# Patient Record
(demographics unavailable — no encounter records)

---

## 2024-10-10 NOTE — CONSULT LETTER
[Dear  ___] : Dear  [unfilled], [Consult Letter:] : I had the pleasure of evaluating your patient, [unfilled]. [( Thank you for referring [unfilled] for consultation for _____ )] : Thank you for referring [unfilled] for consultation for [unfilled] [Please see my note below.] : Please see my note below. [Consult Closing:] : Thank you very much for allowing me to participate in the care of this patient.  If you have any questions, please do not hesitate to contact me. [Sincerely,] : Sincerely, [FreeTextEntry2] : Dr. Jeannine Sharma (pulm/ref)\par  Dr. Dm Ham (PCP)  [FreeTextEntry3] : Ludwig Bee MD, MPH \par  System Director of Thoracic Surgery \par  Director of Comprehensive Lung and Foregut [place] \par  Professor Cardiovascular & Thoracic Surgery \par  [facility]\par  [facility]\par  [address]\par  Oncology [address]\[place] [address]\par  Tel: [phone]\par  Fax: [phone]\par

## 2024-10-10 NOTE — ASSESSMENT
[FreeTextEntry1] : Mr. JULIA MONTERO, 61 year old male, former smoker, w/ hx of hypothyroidism and lung CA.  Now 5 years s/p Rt VATS Robotic-assisted, RUL wedge rxn, RULobectomy, MLND on 8/28/19. 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 Two synchronous primary tumors in the same lobe.  CT Chest on 9/21/24: - post-op changes - Stable left fissural 0.4 cm nodule/lymph node.  - Stable right lower lobe 0.3 cm subpleural nodule (series 4 image 365). - No consolidation, effusion or pneumothorax. No new or enlarging nodules. No lymphadenopathy. - Interval increase in right 10th rib sclerosis with surrounding soft tissue masslike thickening, concerning for metastatic disease.  PET/CT on 10/05/2024:  - Right 10th rib with cortical disruptions and surrounding soft tissue component (images 136- 154) measuring 3.1 x 1.7 cm with diffuse activity of SUV 16.9, compatible with malignant process. - No focal abnormal activity in the right upper lung postsurgical changes. - Left fissural 0.4 cm nodule (image 101) is nonavid; right lower lobe subpleural nodule is not visualized on the low-dose CT which may be due to differences in spatial resolution. - Stable mild nodularity of the medial limb of the left adrenal gland (image 137) with nonspecific activity of SUV 4.7 (previously SUV 3.9); nonspecific activity could also be seen in the right adrenal gland (image 132) showing SUV 4.0 (previously SUV 5.0). These glands are not suspicious for malignant process given their stability since 7/2019 PET/CT.  I have reviewed the patient's medical records and diagnostic images at time of this office consultation and have made the following recommendation: 1. PET scan reviewed, FDG-avid rib lesion is highly suspicious, referred to IR Dr. Bojorquez for biopsy of Rt 10th rib lesion. 2. RTC after biopsy   I, STEVIE Salamanca, personally performed the evaluation and management (E/M) services for this established patient who presents today with (a) new problem(s)/exacerbation of (an) existing condition(s). That E/M includes conducting the examination, assessing all new/exacerbated conditions, and establishing a new plan of care. Today, my ACP, Elaine Cho NP was here to observe my evaluation and management services for this new problem/exacerbated condition to be followed going forward.

## 2024-10-10 NOTE — REASON FOR VISIT
[Follow-Up: _____] : a [unfilled] follow-up visit [Home] : at home, [unfilled] , at the time of the visit. [Medical Office: ([facility])___] : at the medical office located in  [Patient] : the patient [Self] : self [This encounter was initiated by telehealth (audio with video) and converted to telephone (audio only) due to technical difficulties.] : This encounter was initiated by telehealth (audio with video) and converted to telephone (audio only) due to technical difficulties.

## 2024-10-10 NOTE — REASON FOR VISIT
[Follow-Up: _____] : a [unfilled] follow-up visit [Home] : at home, [unfilled] , at the time of the visit. [Medical Office: ([facility])___] : at the medical office located in  [Patient] : the patient [Self] : self [This encounter was initiated by telehealth (audio with video) and converted to telephone (audio only) due to technical difficulties.] : This encounter was initiated by telehealth (audio with video) and converted to telephone (audio only) due to technical difficulties.

## 2024-10-10 NOTE — HISTORY OF PRESENT ILLNESS
[FreeTextEntry1] : Mr. JULIA MONTERO, 61 year old male, former smoker, w/ hx of hypothyroidism and lung CA.  Now 5 years s/p Rt VATS Robotic-assisted, RUL wedge rxn, RULobectomy, MLND on 8/28/19. 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 Two synchronous primary tumors in the same lobe.  CT Chest on 2/15/20: - post-op changes - stable two solid nodules 3 mm in the lingula and superior segment RLL - small nodule midportion of Lt major fissure  CT Chest on 8/8/2020: - stable post-op changes - two stable 3mm nodules in the lingula (3:121) and RLL (3:56) since 7/23/19  CT Chest w/o contrast on 2/13/2021: - S/p RUL lobectomy - two stable 3mm nodules in the lingula and RLL are unchanged.  - small nodule noted in the left major fissure is unchanged.  CT Chest on 3/26/22: - post-op changes - stable 5mm Lt major fissure nodule - stable 3mm RLL nodule   CT chest on 09/24/2022: - post-op changes - Stable 5 mm nodule along the Lt major fissure (2:75)  - stable 2 mm RLL nodule on image 44 - No new nodules  CT chest on 09/16/2023:  - Status post right upper lobectomy with stable postsurgical changes. - Stable 2 mm subpleural nodule within right lower lobe (series 3 image 50).  - Bandlike atelectasis within the lingula. - There is new fracture of the right 10th rib and underlying lucency (series 3 image 130).  Spirometry on 8/30/24: FVC 3.09, 75%; FEV1 2.09, 64%  CT Chest on 9/21/24: - post-op changes - Stable left fissural 0.4 cm nodule/lymph node.  - Stable right lower lobe 0.3 cm subpleural nodule (series 4 image 365). - No consolidation, effusion or pneumothorax. No new or enlarging nodules. No lymphadenopathy. - Interval increase in right 10th rib sclerosis with surrounding soft tissue masslike thickening, concerning for metastatic disease.  On 09/26/2024, CT scan reviewed, increased in right 10th rib sclerosis with surrounding soft tissue masslike thickening, concerning for metastatic disease, I recommended a PET/CT scan. RTC via TTM after PET scan to discuss possible FNA.  PET/CT on 10/05/2024:  - Right 10th rib with cortical disruptions and surrounding soft tissue component (images 136- 154) measuring 3.1 x 1.7 cm with diffuse activity of SUV 16.9, compatible with malignant process. - No focal abnormal activity in the right upper lung postsurgical changes. - Left fissural 0.4 cm nodule (image 101) is nonavid; right lower lobe subpleural nodule is not visualized on the low-dose CT which may be due to differences in spatial resolution. - Stable mild nodularity of the medial limb of the left adrenal gland (image 137) with nonspecific activity of SUV 4.7 (previously SUV 3.9); nonspecific activity could also be seen in the right adrenal gland (image 132) showing SUV 4.0 (previously SUV 5.0). These glands are not suspicious for malignant process given their stability since 7/2019 PET/CT.  Patient is here today for a follow up.

## 2024-10-10 NOTE — CONSULT LETTER
[Dear  ___] : Dear  [unfilled], [Consult Letter:] : I had the pleasure of evaluating your patient, [unfilled]. [( Thank you for referring [unfilled] for consultation for _____ )] : Thank you for referring [unfilled] for consultation for [unfilled] [Please see my note below.] : Please see my note below. [Consult Closing:] : Thank you very much for allowing me to participate in the care of this patient.  If you have any questions, please do not hesitate to contact me. [Sincerely,] : Sincerely, [FreeTextEntry2] : Dr. Jeannine Sharma (pulm/ref)\par  Dr. Dm Ham (PCP)  [FreeTextEntry3] : Ludwig Bee MD, MPH \par  System Director of Thoracic Surgery \par  Director of Comprehensive Lung and Foregut [place] \par  Professor Cardiovascular & Thoracic Surgery \par  [facility] at [facility]\par  [facility]\par  [address]\par  Oncology [address]\[place] [address]\par  Tel: [phone]\par  Fax: [phone]\par

## 2024-10-10 NOTE — CONSULT LETTER
[Dear  ___] : Dear  [unfilled], [Consult Letter:] : I had the pleasure of evaluating your patient, [unfilled]. [( Thank you for referring [unfilled] for consultation for _____ )] : Thank you for referring [unfilled] for consultation for [unfilled] [Please see my note below.] : Please see my note below. [Consult Closing:] : Thank you very much for allowing me to participate in the care of this patient.  If you have any questions, please do not hesitate to contact me. [Sincerely,] : Sincerely, [FreeTextEntry2] : Dr. Jeannine Sharma (pulm/ref)\par  Dr. Dm Ham (PCP)  [FreeTextEntry3] : Ludwig Bee MD, MPH \par  System Director of Thoracic Surgery \par  Director of Comprehensive Lung and Foregut [place] \par  Professor Cardiovascular & Thoracic Surgery \par  [facility] at [facility]\par  [facility]\par  [address]\par  Oncology [address]\[place] [address]\par  Tel: [phone]\par  Fax: [phone]\par

## 2024-10-10 NOTE — REASON FOR VISIT
[Follow-Up: _____] : a [unfilled] follow-up visit [Home] : at home, [unfilled] , at the time of the visit. [Medical Office: ([facility])___] : at the medical office located in  [Patient] : the patient [Self] : self [This encounter was initiated by telehealth (audio with video) and converted to telephone (audio only) due to technical difficulties.] : This encounter was initiated by telehealth (audio with video) and converted to telephone (audio only) due to technical difficulties.

## 2024-10-24 NOTE — ASSESSMENT
[FreeTextEntry1] : Advised Angel to be continue to use oral appliance for his underlying history of obstructive sleep apnea. Return for repeat pulmonary function test and pulmonary follow-up in 2 months. Bipin refused pulmonary treatment because he feels that he is completely asymptomatic from pulmonary perspective.   Will get right rib CT-guided biopsy in the near future.   Thoracic surgery follow-up with Dr. Ludwig Bee.

## 2024-10-24 NOTE — HISTORY OF PRESENT ILLNESS
[TextBox_4] : Has mild right lower chest wall pain/atypical chest pain.  Recently found to have abnormal bone/rib FDG uptake on PET scan.  Scheduled to undergo bone biopsy in the near future.  Dr. Olivares started Angel on oral appliance for ARMANDO.

## 2024-10-24 NOTE — PROCEDURE
[FreeTextEntry1] : Pulmonary Function Test performed in my office today: Spirometry: Moderate obstructive airway disease; Lung Volume: Within normal limits; Diffusion: Severe impairment. ________  Exhaled Nitric Oxide             Final  No Documents Attached    	Test  	Result  	Flag	Reference	Goal	Last Verified  	Exhaled Nitric Oxide	15	 	 		REQUIRED  Ordered by: CATALINO LEAL       Collected/Examined: [id number] 08:09AM       Verification Required       Stage: Final       Performed at: In Office       Performed by: MACY RICE       Resulted: 24Oct2024 08:09AM       Last Updated: 24Oct2024 08:14AM        __________  PET/CT FDG Skull to Thigh ONC Subsequent Treatment Strategy             Final  No Documents Attached    	 EXAM: [id number] - PETCT SK-THI ONC FDG SUBS  - ORDERED BY: STEVIE THORPE   PROCEDURE DATE:  10/05/2024    INTERPRETATION:  CLINICAL INFORMATION: 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 I.V. SITE: Right antecubital fossa UPTAKE PERIOD: 60 minutes SCANNER: Siemens Biograph mCT ORAL CONTRAST: Omnipaque 300 PHARMACOLOGIC INTERVENTION: None.  TECHNIQUE: Following intravenous injection of above radiopharmaceutical and uptake period, PET/CT was performed. CT protocol was optimized for PET attenuation correction and anatomic localization and was not designed to produce and cannot replace state-of-the-art diagnostic CT images with specific imaging protocols for different body parts and indications. Images were reconstructed and reviewed in axial, coronal and sagittal views and three-dimensional MIP.  The standardized uptake values (SUV) are normalized to patient body weight and indicate the highest activity concentration (SUVmax) in a given site. All image numbers refer to axial image number.  COMPARISON:  FDG PET/CT 7/28/2019..  OTHER STUDIES USED FOR CORRELATION: CT chest 9/21/2024.  FINDINGS:  HEAD/NECK: Physiologic FDG activity in visualized brain, head, and neck.  THORAX: Right 10th rib with cortical disruptions and surrounding soft tissue component (images 136- 154) measuring 3.1 x 1.7 cm with diffuse activity of SUV 16.9, compatible with malignant process.  No hypermetabolic nodes in the thorax.  LUNGS: No focal abnormal activity in the right upper lung postsurgical changes. Left fissural 0.4 cm nodule (image 101) is nonavid; right lower lobe subpleural nodule is not visualized on the low-dose CT which may be due to differences in spatial resolution.  No abnormality involving  PLEURA/PERICARDIUM: No abnormal FDG activity. No effusion.  HEPATOBILIARY/PANCREAS: Physiologic FDG activity.  For reference, normal liver demonstrates SUV mean 3.1; previously 3.4.  SPLEEN: Physiologic FDG activity. Normal in size.  ADRENAL GLANDS: Stable mild nodularity of the medial limb of the left adrenal gland (image 137) with nonspecific activity of SUV 4.7 (previously SUV 3.9); nonspecific activity could also be seen in the right adrenal gland (image 132) showing SUV 4.0 (previously SUV 5.0). These glands are not suspicious for malignant process given their stability since 7/2019 PET/CT.  KIDNEYS/URINARY BLADDER: Physiologic excreted FDG activity.  REPRODUCTIVE ORGANS: No abnormal FDG activity.  ABDOMINOPELVIC LYMPH NODES/RETROPERITONEUM: No enlarged or FDG-avid lymph node.  ESOPHAGUS/STOMACH/BOWEL/PERITONEUM/MESENTERY: Distal esophagus with morphologically unremarkable wall showing nonspecific FDG activity.  VESSELS: Few scattered atherosclerotic changes in the aorta and in the coronary arteries. No aneurysm.  BONES/SOFT TISSUES: Aside from the right 10th rib, no other suspicious FDG avid bone lesions.  IMPRESSION: 1. Right rib with cortical disruptions and surrounding soft tissue component associated with diffuse increased activity compatible with malignant processes. Tissue confirmation recommended.  2. No FDG avid lung lesions. Stable nonavid left fissural nodule; right lower lobe subpleural nodule not visualized on low-dose CT which may be due to differences in spatial resolution.  3. No other suspicious FDG avid lesions in the remaining field of view.  --- End of Report ---       KALPANA FRIAS MD; Attending Radiologist This document has been electronically signed. Oct  7 2024  4:52PM  Ordered by: STEVIE THORPE       Collected/Examined: [id number] 02:15PM       Verification Required       Stage: Final       Performed at: [facility] [place]       Resulted: [id number] 04:23PM       Last Updated: 07Oct2024 04:55PM       Accession: [id number]

## 2024-10-24 NOTE — PROCEDURE
[FreeTextEntry1] : Pulmonary Function Test performed in my office today: Spirometry: Moderate obstructive airway disease; Lung Volume: Within normal limits; Diffusion: Severe impairment. ________  Exhaled Nitric Oxide             Final  No Documents Attached    	Test  	Result  	Flag	Reference	Goal	Last Verified  	Exhaled Nitric Oxide	15	 	 		REQUIRED  Ordered by: CATALINO LEAL       Collected/Examined: [id number] 08:09AM       Verification Required       Stage: Final       Performed at: In Office       Performed by: MACY RICE       Resulted: 24Oct2024 08:09AM       Last Updated: 24Oct2024 08:14AM        __________  PET/CT FDG Skull to Thigh ONC Subsequent Treatment Strategy             Final  No Documents Attached    	 EXAM: [id number] - PETCT SK-THI ONC FDG SUBS  - ORDERED BY: STEVIE THORPE   PROCEDURE DATE:  10/05/2024    INTERPRETATION:  CLINICAL INFORMATION: 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 I.V. SITE: Right antecubital fossa UPTAKE PERIOD: 60 minutes SCANNER: Siemens Biograph mCT ORAL CONTRAST: Omnipaque 300 PHARMACOLOGIC INTERVENTION: None.  TECHNIQUE: Following intravenous injection of above radiopharmaceutical and uptake period, PET/CT was performed. CT protocol was optimized for PET attenuation correction and anatomic localization and was not designed to produce and cannot replace state-of-the-art diagnostic CT images with specific imaging protocols for different body parts and indications. Images were reconstructed and reviewed in axial, coronal and sagittal views and three-dimensional MIP.  The standardized uptake values (SUV) are normalized to patient body weight and indicate the highest activity concentration (SUVmax) in a given site. All image numbers refer to axial image number.  COMPARISON:  FDG PET/CT 7/28/2019..  OTHER STUDIES USED FOR CORRELATION: CT chest 9/21/2024.  FINDINGS:  HEAD/NECK: Physiologic FDG activity in visualized brain, head, and neck.  THORAX: Right 10th rib with cortical disruptions and surrounding soft tissue component (images 136- 154) measuring 3.1 x 1.7 cm with diffuse activity of SUV 16.9, compatible with malignant process.  No hypermetabolic nodes in the thorax.  LUNGS: No focal abnormal activity in the right upper lung postsurgical changes. Left fissural 0.4 cm nodule (image 101) is nonavid; right lower lobe subpleural nodule is not visualized on the low-dose CT which may be due to differences in spatial resolution.  No abnormality involving  PLEURA/PERICARDIUM: No abnormal FDG activity. No effusion.  HEPATOBILIARY/PANCREAS: Physiologic FDG activity.  For reference, normal liver demonstrates SUV mean 3.1; previously 3.4.  SPLEEN: Physiologic FDG activity. Normal in size.  ADRENAL GLANDS: Stable mild nodularity of the medial limb of the left adrenal gland (image 137) with nonspecific activity of SUV 4.7 (previously SUV 3.9); nonspecific activity could also be seen in the right adrenal gland (image 132) showing SUV 4.0 (previously SUV 5.0). These glands are not suspicious for malignant process given their stability since 7/2019 PET/CT.  KIDNEYS/URINARY BLADDER: Physiologic excreted FDG activity.  REPRODUCTIVE ORGANS: No abnormal FDG activity.  ABDOMINOPELVIC LYMPH NODES/RETROPERITONEUM: No enlarged or FDG-avid lymph node.  ESOPHAGUS/STOMACH/BOWEL/PERITONEUM/MESENTERY: Distal esophagus with morphologically unremarkable wall showing nonspecific FDG activity.  VESSELS: Few scattered atherosclerotic changes in the aorta and in the coronary arteries. No aneurysm.  BONES/SOFT TISSUES: Aside from the right 10th rib, no other suspicious FDG avid bone lesions.  IMPRESSION: 1. Right rib with cortical disruptions and surrounding soft tissue component associated with diffuse increased activity compatible with malignant processes. Tissue confirmation recommended.  2. No FDG avid lung lesions. Stable nonavid left fissural nodule; right lower lobe subpleural nodule not visualized on low-dose CT which may be due to differences in spatial resolution.  3. No other suspicious FDG avid lesions in the remaining field of view.  --- End of Report ---       KALPANA FRIAS MD; Attending Radiologist This document has been electronically signed. Oct  7 2024  4:52PM  Ordered by: STEVIE THORPE       Collected/Examined: [id number] 02:15PM       Verification Required       Stage: Final       Performed at: [facility]       Resulted: [id number] 04:23PM       Last Updated: 07Oct2024 04:55PM       Accession: [id number]

## 2024-10-24 NOTE — REASON FOR VISIT
[Follow-Up] : a follow-up visit [Abnormal CXR/ Chest CT] : an abnormal CXR/ chest CT [Chest Pain] : chest pain [Asthma] : asthma [Sleep Apnea] : sleep apnea

## 2024-10-29 NOTE — HISTORY OF PRESENT ILLNESS
[FreeTextEntry1] :  61 year old male, former smoker, w/ hx of hypothyroidism and lung CA. s/p Rt VATS Robotic-assisted, RUL wedge rxn, RULobectomy, MLND on 8/28/19. Path revealed RUL AdenoCA,Patient followed by DR. Ludwig Bee.   Patient had f/u Ct chest done on 9/21/24: post-op changes Stable left fissural 0.4 cm nodule/lymph node. Stable right lower lobe 0.3 cm subpleural nodule (series 4 image 365). No consolidation, effusion or pneumothorax. No new or enlarging nodules. No lymphadenopathy. Interval increase in right 10th rib sclerosis with surrounding soft tissue masslike thickening, concerning for metastatic disease. PET/CT on 10/05/2024: Right 10th rib with cortical disruptions and surrounding soft tissue component (images 136- 154) measuring 3.1 x 1.7 cm with diffuse activity of SUV 16.9, compatible with malignant process. No focal abnormal activity in the right upper lung postsurgical changes. Left fissural 0.4 cm nodule (image 101) is nonavid; right lower lobe subpleural nodule is not visualized on the low-dose CT which may be due to differences in spatial resolution.Stable mild nodularity of the medial limb of the left adrenal gland (image 137) with nonspecific activity of SUV 4.7 (previously SUV 3.9); nonspecific activity could also be seen in the right adrenal gland (image 132) showing SUV 4.0 (previously SUV 5.0). These glands are not suspicious for malignant process given their stability since 7/2019 PET/CT.  Patient is now being referred by Dr. Bee for consultation to discuss right 10th rib soft tissue bx.   Denies any recent SOB, CP, fever, chills, n/v/d. ??/

## 2024-11-05 NOTE — HISTORY OF PRESENT ILLNESS
[FreeTextEntry1] : pt is a 62 y/o male with hx of lung cancer  adenocarcinoma 2019 .pt s/p resection .pt with persistent right rib pain .pt with recent pet ct c/w possible metastatic dx for bone biopsy .pt feels well pt denies any chest  pain dizziness ,lightheadedness ,nausea vomiting diaphoresis pt only issue is right rib pain pain rwraps around back right  pt with coronary dx non obstructive by hx calcium score  200s 2021

## 2024-11-05 NOTE — PHYSICAL EXAM
[Alert] : alert [No Respiratory Distress] : no respiratory distress [Normal Rate] : heart rate was normal  [Oriented x3] : oriented to person, place, and time

## 2024-11-05 NOTE — HISTORY OF PRESENT ILLNESS
[FreeTextEntry1] : pt is a 60 y/o male with hx of lung cancer  adenocarcinoma 2019 .pt s/p resection .pt with persistent right rib pain .pt with recent pet ct c/w possible metastatic dx for bone biopsy .pt feels well pt denies any chest  pain dizziness ,lightheadedness ,nausea vomiting diaphoresis pt only issue is right rib pain pain rwraps around back right  pt with coronary dx non obstructive by hx calcium score  200s 2021

## 2024-11-20 NOTE — DATA REVIEWED
[FreeTextEntry1] : I have independently reviewed the following:  CT-guided right 10th rib lesion biopsy on 11/13/24

## 2024-11-20 NOTE — ASSESSMENT
[FreeTextEntry1] : Mr. JULIA MONTERO, 61 year old male, former smoker, w/ hx of hypothyroidism and lung CA.  Now 5 years s/p Rt VATS Robotic-assisted, RUL wedge rxn, RULobectomy, MLND on 8/28/19. 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 Two synchronous primary tumors in the same lobe.  On 09/26/2024, CT scan reviewed, increased in right 10th rib sclerosis with surrounding soft tissue masslike thickening, concerning for metastatic disease, I recommended a PET/CT scan. RTC via TTM after PET scan to discuss possible FNA.   I have reviewed the patient's medical records and diagnostic images at time of this office consultation and have made the following recommendation: 1.    I, STEVIE Salamanca, personally performed the evaluation and management (E/M) services for this established patient who presents today with (a) new problem(s)/exacerbation of (an) existing condition(s).  That E/M includes conducting the examination, assessing all new/exacerbated conditions, and establishing a new plan of care.  Today, my ACP, Britt Mancia, MICHAELA-BC was here to observe my evaluation and management services for this new problem/exacerbated condition to be followed going forward.

## 2024-11-20 NOTE — HISTORY OF PRESENT ILLNESS
[FreeTextEntry1] : Mr. JULIA MONTERO, 61 year old male, former smoker, w/ hx of hypothyroidism and lung CA.  Now 5 years s/p Rt VATS Robotic-assisted, RUL wedge rxn, RULobectomy, MLND on 8/28/19. 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 Two synchronous primary tumors in the same lobe.  CT chest on 09/24/2022: - post-op changes - Stable 5 mm nodule along the Lt major fissure (2:75)  - stable 2 mm RLL nodule on image 44 - No new nodules  CT chest on 09/16/2023:  - Status post right upper lobectomy with stable postsurgical changes. - Stable 2 mm subpleural nodule within right lower lobe (series 3 image 50).  - Bandlike atelectasis within the lingula. - There is new fracture of the right 10th rib and underlying lucency (series 3 image 130).  Spirometry on 8/30/24: FVC 3.09, 75%; FEV1 2.09, 64%  CT Chest on 9/21/24: - post-op changes - Stable left fissural 0.4 cm nodule/lymph node.  - Stable right lower lobe 0.3 cm subpleural nodule (series 4 image 365). - No consolidation, effusion or pneumothorax. No new or enlarging nodules. No lymphadenopathy. - Interval increase in right 10th rib sclerosis with surrounding soft tissue masslike thickening, concerning for metastatic disease.  On 09/26/2024, CT scan reviewed, increased in right 10th rib sclerosis with surrounding soft tissue masslike thickening, concerning for metastatic disease, I recommended a PET/CT scan. RTC via TTM after PET scan to discuss possible FNA.  PET/CT on 10/05/2024:  - Right 10th rib with cortical disruptions and surrounding soft tissue component (images 136- 154) measuring 3.1 x 1.7 cm with diffuse activity of SUV 16.9, compatible with malignant process. - No focal abnormal activity in the right upper lung postsurgical changes. - Left fissural 0.4 cm nodule (image 101) is nonavid; right lower lobe subpleural nodule is not visualized on the low-dose CT which may be due to differences in spatial resolution. - Stable mild nodularity of the medial limb of the left adrenal gland (image 137) with nonspecific activity of SUV 4.7 (previously SUV 3.9); nonspecific activity could also be seen in the right adrenal gland (image 132) showing SUV 4.0 (previously SUV 5.0). These glands are not suspicious for malignant process given their stability since 7/2019 PET/CT.  Now s/p CT-guided right 10th rib lesion biopsy on 11/13/24. Path revealed POSITIVE FOR MALIGNANT CELLS. Metastatic non-small cell carcinoma, favor adenocarcinoma of primary pulmonary origin.   Pt presents today for follow up.

## 2024-11-20 NOTE — CONSULT LETTER
[Dear  ___] : Dear  [unfilled], [Consult Letter:] : I had the pleasure of evaluating your patient, [unfilled]. [( Thank you for referring [unfilled] for consultation for _____ )] : Thank you for referring [unfilled] for consultation for [unfilled] [Please see my note below.] : Please see my note below. [Consult Closing:] : Thank you very much for allowing me to participate in the care of this patient.  If you have any questions, please do not hesitate to contact me. [Sincerely,] : Sincerely, [FreeTextEntry2] : Dr. Jeannine Sharma (pulm/ref)\par  Dr. Dm Ham (PCP)  [FreeTextEntry3] : Ludwig Bee MD, MPH \par  System Director of Thoracic Surgery \par  Director of Comprehensive Lung and Foregut [place] \par  Professor Cardiovascular & Thoracic Surgery \par  [facility]\par  [facility]\par  [address]\par  Oncology [address]\[place] [address]\par  Tel: [phone]\par  Fax: [phone]\par

## 2024-11-20 NOTE — CONSULT LETTER
[Dear  ___] : Dear  [unfilled], [Consult Letter:] : I had the pleasure of evaluating your patient, [unfilled]. [( Thank you for referring [unfilled] for consultation for _____ )] : Thank you for referring [unfilled] for consultation for [unfilled] [Please see my note below.] : Please see my note below. [Consult Closing:] : Thank you very much for allowing me to participate in the care of this patient.  If you have any questions, please do not hesitate to contact me. [Sincerely,] : Sincerely, [FreeTextEntry2] : Dr. Jeannine Sharma (pulm/ref)\par  Dr. Dm Ham (PCP)  [FreeTextEntry3] : Ludwig Bee MD, MPH \par  System Director of Thoracic Surgery \par  Director of Comprehensive Lung and Foregut [place] \par  Professor Cardiovascular & Thoracic Surgery \par  [facility]\par  [facility]\par  [address]\par  Oncology [address]\[place] [address]\par  Tel: [phone]\par  Fax: [phone]\par

## 2024-11-25 NOTE — PHYSICAL EXAM
[Fully active, able to carry on all pre-disease performance without restriction] : Status 0 - Fully active, able to carry on all pre-disease performance without restriction [Normal] : affect appropriate [de-identified] : No icterus  [de-identified] : MMM O/P Clear [de-identified] : Supple No LAD  [de-identified] : Clear [de-identified] : S1 S2 [de-identified] : No edema  [de-identified] : Soft NT No masses  [de-identified] : No spine tenderness; Right lateral lower chest wall tenderness  [de-identified] : Ambulatory

## 2024-11-25 NOTE — HISTORY OF PRESENT ILLNESS
[FreeTextEntry1] : Mr. Foss is a 60 yo man, former smoker with a history of Stage IA lung adenocarcinoma in 2019 who now presents with a right 10th rib metastatic lesion.   He was initially diagnosed in 2019 and underwent a Rt VATS Robotic-assisted RUL wedge resection, RULobectomy and MLND. Path revealed RUL adenocarcinoma, acinar predominant, 1.2 cm and 0.35 cm (minimally invasive adenocarcinoma, non-mucinous), G2, +OSMANI, margins negative and 0/9 LNs involved.  He was followed with active surveillance by Dr. Bee. Subsequent CTs showed a stable 5 mm nodule along the left major fissure and a stable RLL nodule. CT chest in 2023 showed a new fracture of the right 10th rib.  CT Chest 9/21/2024 again showed the stable nodules but noted increase in the right 10th rib sclerosis with surrounding soft tissue masslike thickening, concerning for metastatic disease. PET/CT on 10/05/2024 confirmed the right 10th rib with cortical disruptions and surrounding soft tissue component measuring 3.1 x 3.7 cm with diffuse activity compatible with malignant process, no focal activity int eh right upper lung, nonavid left fissural nodule.   He went on to have a CT-guided biopsy of the 10th rib lesion on 11/12/24. Path confirmed metastatic non-small cell carcinoma, favor adenocarcinoma of primary pulmonary origin.   11/26/ 2024: Mr. Foss presents today for consultation for radiation therapy to a right rib metastatic lesion. He also has an appointment with Dr. Haq today. He continues to follow with his pulmonologist. He has band-like pain along T10 for about a year and a half, rated 10/10 at its worst. He manages the pain with ibuprofen but is unable to lie on his back. He drives long distances for work, which is also painful but manageable. About 2 years ago, he bent down to tie his shoes and stated that he felt a "snap" and he thinks that is when he broke the rib.

## 2024-11-25 NOTE — ASSESSMENT
[Medication(s)] : Medication(s) [Palliative] : Goals of care discussed with patient: Palliative [FreeTextEntry1] : NSCLC with adenocarcinoma histology with a stage Ia tumor status post right upper lobectomy in August 2019.  Followed with surveillance.  Now with recurrent/metastatic disease involving the right 10th rib.  Tumor tested PD-L1 low-positive and molecular testing is pending.  Discussed that his disease represents recurrent disease or metastatic disease; given that he has a limited disease burden, discussed that the goal would be for long term survival. Recommend: - Obtain an MRI of his brain to complete his staging workup - Patient reports that he is unable to lay flat for any length of time.  He required oxycodone during his PET CT scan.  Prescribed some oxycodone to use as needed for significant pain.  I-stop reviewed. - Molecular testing is pending on his biopsy specimen; follow-up results. - Patient met with radiation oncology with plans for RT to his solitary metastasis.  Unclear if this will be SBRT versus palliative RT. - Provided his tumor testing is negative for an actionable mutation appropriate for targeting the first-line setting, discussed and recommended treatment with combination chemotherapy and immunotherapy with carboplatin AUC 5 and pemetrexed 500 mg/m2 with pembrolizumab 200 mg administered IV q. 3 weeks.  Risks, benefits and side effects discussed with the patient who agreed to proceed and signed the consent form; drug information sheets provided. - Obtain blood work in the office today in preparation for systemic therapy - In general, discussed that in the metastatic setting, systemic therapy is usually ongoing, for as long as it benefited the patient, or less he developed an intolerable side effect.  Discussed the plan to treat him with up to 4 cycles of triplet therapy followed by pemetrexed/pembrolizumab maintenance.  Given his limited disease burden, and depending upon his clinical course, if he receives definitive RT dosing to the isolated metastasis, could consider mimicking treatment that would be given in the adjuvant setting. -Patient follows with an outside pain management physician Dr. Jackson.  Currently on gabapentin and meloxicam. - In summary: Obtain MRI brain to complete a staging workup.  Follow-up foundation testing on his biopsy specimen.  If negative for an actionable mutation, would start treatment with chemotherapy and immunotherapy as outlined above.  To pursue RT to the right 10th rib.

## 2024-11-25 NOTE — HISTORY OF PRESENT ILLNESS
[Disease: _____________________] : Disease: [unfilled] [de-identified] : Mr. Foss is a 61-year-old man, former smoker, who was found with RUL lung nodule on CT lung screening in July 2019. In August 2019 he underwent right upper lobectomy revealing a 1.2cm lung adenocarcinoma, acinar predominant, with negative margins and no lymph node involvement, and a 0.35cm minimally invasive adenocarcinoma in the same lobe. He was monitored with surveillance scans. CT Chest on 9/16/23 revealed a new fracture of the right 10th rib with underlying lucency. CT Chest 9/21/24 showed increase in the right 10th rib sclerosis with surrounding soft tissue masslike thickening, concerning for metastatic disease. PET CT 10/5/24 reported diffuse activity of the right 10th rib with cortical disruptions and surrounding soft tissue component measuring 3.1 x 1.7cm, compatible with malignant process. No other suspicious FDG avid lesions. He underwent CT guided core biopsy of the right 10th rib on 11/13/24, with pathology interpreted as showing metastatic non-small cell carcinoma, favor adenocarcinoma or primary pulmonary origin; tumor tested PDL1 Low-Positive. He is now referred for initial medical oncology consultation.   Patient reports pain in his mid to lower back that wraps around anteriorly mostly on the right side.  Pain started about 6 months after he had traumatic falls.  He follows with an outside pain management specialist, Dr. Jackson and is on gabapentin and meloxicam.  He also notes that he has a disc herniation at T11/T12; he did PT in the past as well as saw a chiropractor without improvement.  He continues to work full-time.  Denies cough.  Weight stable. [de-identified] : Right 10th rib, CT-guided core biopsy: metastatic non-small cell carcinoma, favor adenocarcinoma of primary pulmonary origin.  PD-L1 low-positive at 20%.

## 2024-11-25 NOTE — PHYSICAL EXAM
[Normal] : normal heart rate and rhythm, normal S1 and S2, and no murmurs present [de-identified] : TTP along right 10th rib, lateral

## 2024-11-25 NOTE — PHYSICAL EXAM
[Normal] : normal heart rate and rhythm, normal S1 and S2, and no murmurs present [de-identified] : TTP along right 10th rib, lateral

## 2024-11-25 NOTE — HISTORY OF PRESENT ILLNESS
[Disease: _____________________] : Disease: [unfilled] [de-identified] : Mr. Foss is a 61-year-old man, former smoker, who was found with RUL lung nodule on CT lung screening in July 2019. In August 2019 he underwent right upper lobectomy revealing a 1.2cm lung adenocarcinoma, acinar predominant, with negative margins and no lymph node involvement, and a 0.35cm minimally invasive adenocarcinoma in the same lobe. He was monitored with surveillance scans. CT Chest on 9/16/23 revealed a new fracture of the right 10th rib with underlying lucency. CT Chest 9/21/24 showed increase in the right 10th rib sclerosis with surrounding soft tissue masslike thickening, concerning for metastatic disease. PET CT 10/5/24 reported diffuse activity of the right 10th rib with cortical disruptions and surrounding soft tissue component measuring 3.1 x 1.7cm, compatible with malignant process. No other suspicious FDG avid lesions. He underwent CT guided core biopsy of the right 10th rib on 11/13/24, with pathology interpreted as showing metastatic non-small cell carcinoma, favor adenocarcinoma or primary pulmonary origin; tumor tested PDL1 Low-Positive. He is now referred for initial medical oncology consultation.   Patient reports pain in his mid to lower back that wraps around anteriorly mostly on the right side.  Pain started about 6 months after he had traumatic falls.  He follows with an outside pain management specialist, Dr. Jackson and is on gabapentin and meloxicam.  He also notes that he has a disc herniation at T11/T12; he did PT in the past as well as saw a chiropractor without improvement.  He continues to work full-time.  Denies cough.  Weight stable. [de-identified] : Right 10th rib, CT-guided core biopsy: metastatic non-small cell carcinoma, favor adenocarcinoma of primary pulmonary origin.  PD-L1 low-positive at 20%.

## 2024-11-25 NOTE — PHYSICAL EXAM
[Normal] : normal heart rate and rhythm, normal S1 and S2, and no murmurs present [de-identified] : TTP along right 10th rib, lateral

## 2024-11-25 NOTE — HISTORY OF PRESENT ILLNESS
[FreeTextEntry1] : Mr. Foss is a 60 yo man, former smoker with a history of Stage IA lung adenocarcinoma in 2019 who now presents with a right 10th rib metastatic lesion.   He was initially diagnosed in 2019 and underwent a Rt VATS Robotic-assisted RUL wedge resection, RULobectomy and MLND. Path revealed RUL adenocarcinoma, acinar predominant, 1.2 cm and 0.35 cm (minimally invasive adenocarcinoma, non-mucinous), G2, +OSMANI, margins negative and 0/9 LNs involved.  He was followed with active surveillance by Dr. eBe. Subsequent CTs showed a stable 5 mm nodule along the left major fissure and a stable RLL nodule. CT chest in 2023 showed a new fracture of the right 10th rib.  CT Chest 9/21/2024 again showed the stable nodules but noted increase in the right 10th rib sclerosis with surrounding soft tissue masslike thickening, concerning for metastatic disease. PET/CT on 10/05/2024 confirmed the right 10th rib with cortical disruptions and surrounding soft tissue component measuring 3.1 x 3.7 cm with diffuse activity compatible with malignant process, no focal activity int eh right upper lung, nonavid left fissural nodule.   He went on to have a CT-guided biopsy of the 10th rib lesion on 11/12/24. Path confirmed metastatic non-small cell carcinoma, favor adenocarcinoma of primary pulmonary origin.   11/26/ 2024: Mr. Foss presents today for consultation for radiation therapy to a right rib metastatic lesion. He also has an appointment with Dr. Haq today. He continues to follow with his pulmonologist. He has band-like pain along T10 for about a year and a half, rated 10/10 at its worst. He manages the pain with ibuprofen but is unable to lie on his back. He drives long distances for work, which is also painful but manageable. About 2 years ago, he bent down to tie his shoes and stated that he felt a "snap" and he thinks that is when he broke the rib.

## 2024-11-25 NOTE — VITALS
[Maximal Pain Intensity: 8/10] : 8/10 [Least Pain Intensity: 3/10] : 3/10 [Pain Description/Quality: ___] : Pain description/quality: [unfilled] [Pain Duration: ___] : Pain duration: [unfilled] [Pain Location: ___] : Pain Location: [unfilled] [Pain Interferes with ADLs] : Pain interferes with activities of daily living. [80: Normal activity with effort; some signs or symptoms of disease.] : 80: Normal activity with effort; some signs or symptoms of disease.  [Date: ____________] : Patient's last distress assessment performed on [unfilled]. [6 - Distress Level] : Distress Level: 6 [Referred Patient  to social work for follow-up] : Patient was referred to social work for follow-up [Patient given social work contact information and resource sheet] : Patient was given social work contact information and resource sheet

## 2024-11-25 NOTE — REVIEW OF SYSTEMS
[Loss of Hearing] : loss of hearing [Hoarseness] : hoarseness [Cough] : cough [SOB on Exertion] : shortness of breath during exertion [Nocturia] : nocturia [Urinary Frequency] : urinary frequency [Easy Bleeding] : a tendency for easy bleeding [Easy Bruising] : a tendency for easy bruising [Negative] : Allergic/Immunologic [Dysphagia] : no dysphagia [Nosebleeds] : no nosebleeds [Odynophagia] : no odynophagia [Mucosal Pain] : no mucosal pain [Shortness Of Breath] : no shortness of breath [Wheezing] : no wheezing [Swollen Glands] : no swollen glands [FreeTextEntry4] : bilateral hearing loos, does not use hearing aids

## 2024-11-25 NOTE — RESULTS/DATA
[FreeTextEntry1] : Images Reviewed/Interpreted:   - CT Chest 9/21/24: Since September 16, 2023; Interval increase in right 10th rib sclerosis with surrounding soft tissue masslike thickening, concerning for metastatic disease. Post right upper lobectomy with expected stable postsurgical changes.  - PET/CT 10/5/24:   1. Right rib with cortical disruptions and surrounding soft tissue component associated with diffuse increased activity compatible with malignant processes. Tissue confirmation recommended. 2. No FDG avid lung lesions. Stable nonavid left fissural nodule; right lower lobe subpleural nodule not visualized on low-dose CT which may be due to differences in spatial resolution. 3. No other suspicious FDG avid lesions in the remaining field of view.

## 2024-11-25 NOTE — PHYSICAL EXAM
[Fully active, able to carry on all pre-disease performance without restriction] : Status 0 - Fully active, able to carry on all pre-disease performance without restriction [Normal] : affect appropriate [de-identified] : No icterus  [de-identified] : MMM O/P Clear [de-identified] : Supple No LAD  [de-identified] : Clear [de-identified] : S1 S2 [de-identified] : No edema  [de-identified] : Soft NT No masses  [de-identified] : No spine tenderness; Right lateral lower chest wall tenderness  [de-identified] : Ambulatory

## 2024-12-30 NOTE — HISTORY OF PRESENT ILLNESS
[FreeTextEntry1] : Mr. Foss is a 60 yo man with a history of Stage IA2 [id number] adenocarcinoma of the lung, treated Rt VATS in 2019 who now presents with a biopsy proven metastatic lesion in his right 10th rib.  He went for his surveillance CT in September 2024 which showed a soft tissue mass-like thickening around the 10th rib. A PET/CT confirmed that this lesion was FDG-avid, concerning for metastasis. He underwent a biopsy of the lesion on 11/12/2024 which revealed NSCLC, favor adenocarcinoma of primary pulmonary origin but without any other areas of metastasis or josselin disease.  We discussed his pathology and imaging to date. I recommended an MRI of the brain to complete his staging imaging. He will see Dr. Haq for consultation for systemic therapy for his metastatic disease. If this is truly an oligometastatic lesion, he would benefit from SBRT 40 Gy/5 fx for improved pain and disease control.   1/2/2025: Patient presents to clinic for OTV s/p 4/5 fx of 4000 cGy to the 10th rib.

## 2024-12-30 NOTE — DISEASE MANAGEMENT
[Pathological] : TNM Stage: p [TTNM] : 1b [NTNM] : 0 [MTNM] : 0 [IA2] : IA2 [de-identified] : 3200 cGy [de-identified] : 4000 cGy [de-identified] : 10th rib

## 2024-12-30 NOTE — HISTORY OF PRESENT ILLNESS
[FreeTextEntry1] : Mr. Foss is a 62 yo man with a history of Stage IA2 [id number] adenocarcinoma of the lung, treated Rt VATS in 2019 who now presents with a biopsy proven metastatic lesion in his right 10th rib.  He went for his surveillance CT in September 2024 which showed a soft tissue mass-like thickening around the 10th rib. A PET/CT confirmed that this lesion was FDG-avid, concerning for metastasis. He underwent a biopsy of the lesion on 11/12/2024 which revealed NSCLC, favor adenocarcinoma of primary pulmonary origin but without any other areas of metastasis or josselin disease.  We discussed his pathology and imaging to date. I recommended an MRI of the brain to complete his staging imaging. He will see Dr. Haq for consultation for systemic therapy for his metastatic disease. If this is truly an oligometastatic lesion, he would benefit from SBRT 40 Gy/5 fx for improved pain and disease control.   1/2/2025: Patient presents to clinic for OTV s/p 4/5 fx of 4000 cGy to the 10th rib.

## 2024-12-30 NOTE — HISTORY OF PRESENT ILLNESS
[FreeTextEntry1] : Mr. Foss is a 60 yo man with a history of Stage IA2 [id number] adenocarcinoma of the lung, treated Rt VATS in 2019 who now presents with a biopsy proven metastatic lesion in his right 10th rib.  He went for his surveillance CT in September 2024 which showed a soft tissue mass-like thickening around the 10th rib. A PET/CT confirmed that this lesion was FDG-avid, concerning for metastasis. He underwent a biopsy of the lesion on 11/12/2024 which revealed NSCLC, favor adenocarcinoma of primary pulmonary origin but without any other areas of metastasis or josselin disease.  We discussed his pathology and imaging to date. I recommended an MRI of the brain to complete his staging imaging. He will see Dr. Haq for consultation for systemic therapy for his metastatic disease. If this is truly an oligometastatic lesion, he would benefit from SBRT 40 Gy/5 fx for improved pain and disease control.   1/2/2025: Patient presents to clinic for OTV s/p 4/5 fx of 4000 cGy to the 10th rib.

## 2024-12-30 NOTE — DISEASE MANAGEMENT
[Pathological] : TNM Stage: p [TTNM] : 1b [NTNM] : 0 [MTNM] : 0 [IA2] : IA2 [de-identified] : 3200 cGy [de-identified] : 4000 cGy [de-identified] : 10th rib

## 2024-12-30 NOTE — DISEASE MANAGEMENT
[Pathological] : TNM Stage: p [TTNM] : 1b [NTNM] : 0 [MTNM] : 0 [IA2] : IA2 [de-identified] : 3200 cGy [de-identified] : 4000 cGy [de-identified] : 10th rib

## 2024-12-31 NOTE — HISTORY OF PRESENT ILLNESS
[Disease: _____________________] : Disease: [unfilled] [de-identified] : Mr. Foss is a 61-year-old man, former smoker, who was found with RUL lung nodule on CT lung screening in July 2019. In August 2019 he underwent right upper lobectomy revealing a 1.2cm lung adenocarcinoma, acinar predominant, with negative margins and no lymph node involvement, and a 0.35cm minimally invasive adenocarcinoma in the same lobe. He was monitored with surveillance scans. CT Chest on 9/16/23 revealed a new fracture of the right 10th rib with underlying lucency. CT Chest 9/21/24 showed increase in the right 10th rib sclerosis with surrounding soft tissue masslike thickening, concerning for metastatic disease. PET CT 10/5/24 reported diffuse activity of the right 10th rib with cortical disruptions and surrounding soft tissue component measuring 3.1 x 1.7cm, compatible with malignant process. No other suspicious FDG avid lesions. He underwent CT guided core biopsy of the right 10th rib on 11/13/24, with pathology interpreted as showing metastatic non-small cell carcinoma, favor adenocarcinoma or primary pulmonary origin; tumor tested PDL1 Low-Positive. Foundation CDx did not show any actionable mutations (RB1 loss, STK11 E293, TP53). He started first line systemic therapy with combination chemotherapy and immunotherapy with carboplatin/pemetrexed and pembrolizumab on 12/19/24. He started SBRT to the 10th rib on 12/23/24, scheduled to complete on 1/6/25.  [de-identified] : Right 10th rib, CT-guided core biopsy: metastatic non-small cell carcinoma, favor adenocarcinoma of primary pulmonary origin.  PD-L1 low-positive at 20%. [de-identified] : Mr. Foss started first line systemic treatment with carboplatin, pemetrexed, and pembrolizumab on 12/19/24. He started SBRT to the 10th rib on 12/23/24; currently 3/5 fx.  He is here for mid-cycle evaluation.  He reports "agita" and heartburn for 2 days after chemotherapy. Hiccups on and off the day of chemo reported bothersome. Also transient constipation relieved with stool softeners. Nausea on and off. 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  Not sleeping as well as he was before.  Feels anxious. Doesn't like the idea of pre-chemo fingerstick. Denies fever, chills, vomiting, diarrhea,  Takes oxycodone right before radiation due to pain to the right side on laying on the RT table. Pain is in the back and wraps around to the right anterior chest.  States post-chemoimmunotherapy side effects resolved now and that he feels back to his normal self.

## 2024-12-31 NOTE — RESULTS/DATA
[FreeTextEntry1] : Images Reviewed/Interpreted:   - CT Chest 9/21/24: Since September 16, 2023; Interval increase in right 10th rib sclerosis with surrounding soft tissue masslike thickening, concerning for metastatic disease. Post right upper lobectomy with expected stable postsurgical changes.  - PET/CT 10/5/24:   1. Right rib with cortical disruptions and surrounding soft tissue component associated with diffuse increased activity compatible with malignant processes. Tissue confirmation recommended. 2. No FDG avid lung lesions. Stable nonavid left fissural nodule; right lower lobe subpleural nodule not visualized on low-dose CT which may be due to differences in spatial resolution. 3. No other suspicious FDG avid lesions in the remaining field of view.  - MRI brain w/wo IV contrast 12/5/24: No evidence of intracranial metastasis.

## 2024-12-31 NOTE — PHYSICAL EXAM
[Fully active, able to carry on all pre-disease performance without restriction] : Status 0 - Fully active, able to carry on all pre-disease performance without restriction [Normal] : affect appropriate [de-identified] : No icterus  [de-identified] : MMM O/P Clear [de-identified] : Supple No LAD  [de-identified] : Clear [de-identified] : S1 S2 [de-identified] : No edema  [de-identified] : Soft NT No masses  [de-identified] : No spine tenderness; Right lateral lower chest wall tenderness  [de-identified] : Ambulatory

## 2024-12-31 NOTE — ASSESSMENT
[Future Reassessment of Pain Scale] : Future reassessment of pain scale    [Medication(s)] : Medication(s) [Palliative] : Goals of care discussed with patient: Palliative [FreeTextEntry1] : NSCLC with adenocarcinoma histology with a stage IA tumor status post right upper lobectomy in August 2019.  Followed with surveillance.  Now with recurrent/metastatic disease involving the right 10th rib.  Tumor tested PD-L1 low-positive and no actionable mutations on tissue testing (RB1 loss, STK11 E293, TP53).  His disease represents recurrent disease or metastatic disease; given that he has a limited disease burden, discussed that the goal would be for long term survival. His case was presented at thoracic tumor board 11/27/24, plan for SBRT to rib metastasis for pain control. Systemic therapy recommended. Consideration of chest wall resection with reconstruction depending on clinical course. MRI brain revealed no intracranial metastases. He started first line systemic therapy with combination chemotherapy and immunotherapy with carboplatin, pemetrexed, and pembrolizumab on 12/19/24. He started SBRT to the 10th rib on 12/23/24.  Recommend: - Continue first line systemic therapy with carboplatin AUC 5 and pemetrexed 500 mg/m2 with pembrolizumab 200 mg administered IV q. 3 weeks. Plan for 4 cycles of triplet therapy followed by maintenance pemetrexed and pembrolizumab for as long as it benefits the patient.  - Bloodwork today. Check TFTs periodically.  - Continue folic acid 1mg by mouth daily, vitamin B12 injection [id number], and dexamethasone in the gurmeet-chemo setting.  - Consider changing IV pre-medication from dexamethasone to methylprednisolone to decrease risk of hiccups. - For acid reflux/"agita" can take Tums or famotidine as needed. Make sure to take gurmeet-chemo oral dex with food to minimize stomach irritation. Sit upright after eating, avoid acidic/spicy foods, etc.   - Continue SBRT to right 10th rib with Dr. Shoemaker. Taking oxycodone prior to RT due to pain from laying on RT table.  - Recheck TSH and FT4 today, Low TSH with elevated FT4 on Nov visit. Pt on levothyroxine managed by his PMD. Advised to see PMD.  - Continue follow up with outside pain management physician Dr. Jackson.  Currently on gabapentin and meloxicam. - Declined psych-onc referral for anxiety.  - Distress tool completed by patient. He has  contact information as needed.  - Continue midcycle follow up while on triplet therapy.  - Plan for imaging after 3-4 cycles.

## 2025-01-02 NOTE — REVIEW OF SYSTEMS
[Dyspepsia: Grade 0] : Dyspepsia: Grade 0 [Dysphagia: Grade 0] : Dysphagia: Grade 0 [Esophagitis: Grade 0] : Esophagitis: Grade 0 [Nausea: Grade 0] : Nausea: Grade 0 [Vomiting: Grade 0] : Vomiting: Grade 0 [Fatigue: Grade 1 - Fatigue relieved by rest] : Fatigue: Grade 1 - Fatigue relieved by rest [Cough: Grade 1 - Mild symptoms; nonprescription intervention indicated] : Cough: Grade 1 - Mild symptoms; nonprescription intervention indicated [Dyspnea: Grade 1 - Shortness of breath with moderate exertion] : Dyspnea: Grade 1 - Shortness of breath with moderate exertion [Pruritus: Grade 0] : Pruritus: Grade 0 [Skin Hyperpigmentation: Grade 0] : Skin Hyperpigmentation: Grade 0 [Dermatitis Radiation: Grade 0] : Dermatitis Radiation: Grade 0

## 2025-01-02 NOTE — VITALS
[Maximal Pain Intensity: 8/10] : 8/10 [Least Pain Intensity: 3/10] : 3/10 [Pain Description/Quality: ___] : Pain description/quality: [unfilled] [Pain Duration: ___] : Pain duration: [unfilled] [Pain Location: ___] : Pain Location: [unfilled] [Maximal Pain Intensity: 7/10] : 7/10 [Pain Interferes with ADLs] : Pain interferes with activities of daily living. [80: Normal activity with effort; some signs or symptoms of disease.] : 80: Normal activity with effort; some signs or symptoms of disease.

## 2025-01-15 NOTE — HISTORY OF PRESENT ILLNESS
[FreeTextEntry1] : follow up  [de-identified] : 60 yo hx hypothyroidism, htn, hld, lung cancer stage IV presenting for follow up.  had blood work done december lung cancer mets to rib- on radiation and chemo x 2 sessions. and keytruda. feels ok, fatigued has a little nausea after treatment.  has chronic "wrap around" back pain with T11 herniated disc. slightly improved after starting radiation/chemo for rib mets.

## 2025-01-15 NOTE — PLAN
[FreeTextEntry1] : hypothyroidism - tsh now in range continue levothyroxine 175 mcg htn - BP at goal continue valsartan fu 6 months

## 2025-01-21 NOTE — PHYSICAL EXAM
[Normal] : affect appropriate [Restricted in physically strenuous activity but ambulatory and able to carry out work of a light or sedentary nature] : Status 1- Restricted in physically strenuous activity but ambulatory and able to carry out work of a light or sedentary nature, e.g., light house work, office work [de-identified] : No icterus  [de-identified] : MMM O/P Clear [de-identified] : Supple No LAD  [de-identified] : CTAB [de-identified] : S1 S2 [de-identified] : No edema  [de-identified] : Soft NT No masses  [de-identified] : No spine tenderness; Right lateral lower chest wall tenderness  [de-identified] : Ambulatory

## 2025-01-21 NOTE — PHYSICAL EXAM
[Normal] : affect appropriate [Restricted in physically strenuous activity but ambulatory and able to carry out work of a light or sedentary nature] : Status 1- Restricted in physically strenuous activity but ambulatory and able to carry out work of a light or sedentary nature, e.g., light house work, office work [de-identified] : No icterus  [de-identified] : MMM O/P Clear [de-identified] : Supple No LAD  [de-identified] : CTAB [de-identified] : S1 S2 [de-identified] : No edema  [de-identified] : Soft NT No masses  [de-identified] : No spine tenderness; Right lateral lower chest wall tenderness  [de-identified] : Ambulatory

## 2025-01-21 NOTE — HISTORY OF PRESENT ILLNESS
[Disease: _____________________] : Disease: [unfilled] [de-identified] : Mr. Foss is a 61-year-old man, former smoker, who was found with RUL lung nodule on CT lung screening in July 2019. In August 2019 he underwent right upper lobectomy revealing a 1.2cm lung adenocarcinoma, acinar predominant, with negative margins and no lymph node involvement, and a 0.35cm minimally invasive adenocarcinoma in the same lobe. He was monitored with surveillance scans. CT Chest on 9/16/23 revealed a new fracture of the right 10th rib with underlying lucency. CT Chest 9/21/24 showed increase in the right 10th rib sclerosis with surrounding soft tissue masslike thickening, concerning for metastatic disease. PET CT 10/5/24 reported diffuse activity of the right 10th rib with cortical disruptions and surrounding soft tissue component measuring 3.1 x 1.7cm, compatible with malignant process. No other suspicious FDG avid lesions. He underwent CT guided core biopsy of the right 10th rib on 11/13/24, with pathology interpreted as showing metastatic non-small cell carcinoma, favor adenocarcinoma or primary pulmonary origin; tumor tested PDL1 Low-Positive. Foundation CDx did not show any actionable mutations (RB1 loss, STK11 E293, TP53). He started first line systemic therapy with combination chemotherapy and immunotherapy with carboplatin/pemetrexed and pembrolizumab on 12/19/24. He started SBRT to the 10th rib on 12/23/24, completed on 1/6/25.  [de-identified] : Right 10th rib, CT-guided core biopsy: metastatic non-small cell carcinoma, favor adenocarcinoma of primary pulmonary origin.  PD-L1 low-positive at 20%. [de-identified] : Mr. Foss started first line systemic treatment with carboplatin, pemetrexed, and pembrolizumab on 12/19/24. Completed SBRT to the right 10th rib on 1/6/25. He is here for mid-cycle evaluation after receiving C2 on 1/9/25.  States side effects seemed to last longer after C2.  Reports fatigue and transient nausea which goes away on its own; he hasn't taken antiemetic.  Chronically feels nasal passages are clogged with reported habit of taking Afrin nasal spray 4x/day. In the morning occasionally scant blood tinge from nose.  States son who lives downstairs has cough with Flu and that he has been masking and minimizing contact.  Pt reports feeling anxious because he has occasional cough for the past 4-5 days, minimally productive of clear phlegm.  Denies fever, chills, body ache, dyspnea, tinnitus, diarrhea.  Miralax x 2 days for mild constipation after chemo helped.  No complaint of hiccups or heartburn at this visit.  Appetite reported good.

## 2025-01-21 NOTE — ASSESSMENT
[Future Reassessment of Pain Scale] : Future reassessment of pain scale    [Medication(s)] : Medication(s) [Palliative] : Goals of care discussed with patient: Palliative [FreeTextEntry1] : 61M with NSCLC with adenocarcinoma histology with a stage IA tumor status post right upper lobectomy in August 2019.  Followed with surveillance.  Now with recurrent/metastatic disease involving the right 10th rib.  Tumor tested PD-L1 low-positive and no actionable mutations on tissue testing (RB1 loss, STK11 E293, TP53).  His disease represents recurrent disease or metastatic disease; given that he has a limited disease burden, discussed that the goal would be for long term survival. His case was presented at thoracic tumor board 11/27/24, plan for SBRT to rib metastasis for pain control. Systemic therapy recommended. Consideration of chest wall resection with reconstruction depending on clinical course. MRI brain revealed no intracranial metastases. He started first line systemic therapy with combination chemotherapy and immunotherapy with carboplatin, pemetrexed, and pembrolizumab on 12/19/24. He received SBRT to the right 10th rib 12/23/24 - 1/6/25.  s/p cycle 2 carboplatin, pemetrexed, and pembrolizumab on 1/9/25.  Recommend: - Continue first line systemic therapy with carboplatin AUC 5 and pemetrexed 500 mg/m2 with pembrolizumab 200 mg administered IV every 3 weeks x 4 cycles followed by maintenance pemetrexed and pembrolizumab for as long as it benefits the patient. s/p C2 triplet therapy on 1/9/25.  - Bloodwork today. Check TFTs periodically.  - Continue folic acid 1mg by mouth daily, vitamin B12 injection [id number], and dexamethasone in the gurmeet-chemo setting.  - Completed SBRT to right 10th rib on 1/6/25. Continue follow up with Dr. Shoemaker. Pain reported slightly improved. - Follows with outpatient pain management Dr. Jackson, on meloxicam and gabapentin.  - Respiratory swab for Flu/RSV/Covid given pt cough and concerns about contact with son who is reportedly flu+. Advised continue to mask, wash hands frequently, avoid contact with any symptomatic individuals.  - Advised to cut down on Afrin nasal spray use; can use oral antihistamine, nasal saline, fluticasone as needed. He can address with PMD.  - Continue bowel regimen for constipation post-chemo.  - Continue management of hypothyroidism by PMD. He recently followed up and continues levothyroxine 175mcg daily.  - Declined psych-onc referral for anxiety exacerbated by cancer diagnosis and treatment.  - Printed requisition for CT chest without contrast provided to pt, to be done 2/4 (after 3 cycles) and reviewed at next midcycle appt on 2/11 with MD.  - Continue midcycle follow up while on triplet therapy.

## 2025-01-21 NOTE — HISTORY OF PRESENT ILLNESS
[Disease: _____________________] : Disease: [unfilled] [de-identified] : Mr. Foss is a 61-year-old man, former smoker, who was found with RUL lung nodule on CT lung screening in July 2019. In August 2019 he underwent right upper lobectomy revealing a 1.2cm lung adenocarcinoma, acinar predominant, with negative margins and no lymph node involvement, and a 0.35cm minimally invasive adenocarcinoma in the same lobe. He was monitored with surveillance scans. CT Chest on 9/16/23 revealed a new fracture of the right 10th rib with underlying lucency. CT Chest 9/21/24 showed increase in the right 10th rib sclerosis with surrounding soft tissue masslike thickening, concerning for metastatic disease. PET CT 10/5/24 reported diffuse activity of the right 10th rib with cortical disruptions and surrounding soft tissue component measuring 3.1 x 1.7cm, compatible with malignant process. No other suspicious FDG avid lesions. He underwent CT guided core biopsy of the right 10th rib on 11/13/24, with pathology interpreted as showing metastatic non-small cell carcinoma, favor adenocarcinoma or primary pulmonary origin; tumor tested PDL1 Low-Positive. Foundation CDx did not show any actionable mutations (RB1 loss, STK11 E293, TP53). He started first line systemic therapy with combination chemotherapy and immunotherapy with carboplatin/pemetrexed and pembrolizumab on 12/19/24. He started SBRT to the 10th rib on 12/23/24, completed on 1/6/25.  [de-identified] : Right 10th rib, CT-guided core biopsy: metastatic non-small cell carcinoma, favor adenocarcinoma of primary pulmonary origin.  PD-L1 low-positive at 20%. [de-identified] : Mr. Foss started first line systemic treatment with carboplatin, pemetrexed, and pembrolizumab on 12/19/24. Completed SBRT to the right 10th rib on 1/6/25. He is here for mid-cycle evaluation after receiving C2 on 1/9/25.  States side effects seemed to last longer after C2.  Reports fatigue and transient nausea which goes away on its own; he hasn't taken antiemetic.  Chronically feels nasal passages are clogged with reported habit of taking Afrin nasal spray 4x/day. In the morning occasionally scant blood tinge from nose.  States son who lives downstairs has cough with Flu and that he has been masking and minimizing contact.  Pt reports feeling anxious because he has occasional cough for the past 4-5 days, minimally productive of clear phlegm.  Denies fever, chills, body ache, dyspnea, tinnitus, diarrhea.  Miralax x 2 days for mild constipation after chemo helped.  No complaint of hiccups or heartburn at this visit.  Appetite reported good.

## 2025-01-21 NOTE — ASSESSMENT
[Future Reassessment of Pain Scale] : Future reassessment of pain scale    [Medication(s)] : Medication(s) [Palliative] : Goals of care discussed with patient: Palliative [FreeTextEntry1] : 61M with NSCLC with adenocarcinoma histology with a stage IA tumor status post right upper lobectomy in August 2019.  Followed with surveillance.  Now with recurrent/metastatic disease involving the right 10th rib.  Tumor tested PD-L1 low-positive and no actionable mutations on tissue testing (RB1 loss, STK11 E293, TP53).  His disease represents recurrent disease or metastatic disease; given that he has a limited disease burden, discussed that the goal would be for long term survival. His case was presented at thoracic tumor board 11/27/24, plan for SBRT to rib metastasis for pain control. Systemic therapy recommended. Consideration of chest wall resection with reconstruction depending on clinical course. MRI brain revealed no intracranial metastases. He started first line systemic therapy with combination chemotherapy and immunotherapy with carboplatin, pemetrexed, and pembrolizumab on 12/19/24. He received SBRT to the right 10th rib 12/23/24 - 1/6/25.  s/p cycle 2 carboplatin, pemetrexed, and pembrolizumab on 1/9/25.  Recommend: - Continue first line systemic therapy with carboplatin AUC 5 and pemetrexed 500 mg/m2 with pembrolizumab 200 mg administered IV every 3 weeks x 4 cycles followed by maintenance pemetrexed and pembrolizumab for as long as it benefits the patient. s/p C2 triplet therapy on 1/9/25.  - Bloodwork today. Check TFTs periodically.  - Continue folic acid 1mg by mouth daily, vitamin B12 injection [id number], and dexamethasone in the gurmeet-chemo setting.  - Completed SBRT to right 10th rib on 1/6/25. Continue follow up with Dr. Shoemaker. Pain reported slightly improved. - Follows with outpatient pain management Dr. Jackson, on meloxicam and gabapentin.  - Respiratory swab for Flu/RSV/Covid given pt cough and concerns about contact with son who is reportedly flu+. Advised continue to mask, wash hands frequently, avoid contact with any symptomatic individuals.  - Advised to cut down on Afrin nasal spray use; can use oral antihistamine, nasal saline, fluticasone as needed. He can address with PMD.  - Continue bowel regimen for constipation post-chemo.  - Continue management of hypothyroidism by PMD. He recently followed up and continues levothyroxine 175mcg daily.  - Declined psych-onc referral for anxiety exacerbated by cancer diagnosis and treatment.  - Printed requisition for CT chest without contrast provided to pt, to be done 2/4 (after 3 cycles) and reviewed at next midcycle appt on 2/11 with MD.  - Continue midcycle follow up while on triplet therapy.

## 2025-02-05 NOTE — REVIEW OF SYSTEMS
[Dyspepsia: Grade 0] : Dyspepsia: Grade 0 [Dysphagia: Grade 0] : Dysphagia: Grade 0 [Esophagitis: Grade 0] : Esophagitis: Grade 0 [Nausea: Grade 0] : Nausea: Grade 0 [Vomiting: Grade 0] : Vomiting: Grade 0 [Fatigue: Grade 1 - Fatigue relieved by rest] : Fatigue: Grade 1 - Fatigue relieved by rest [Cough: Grade 1 - Mild symptoms; nonprescription intervention indicated] : Cough: Grade 1 - Mild symptoms; nonprescription intervention indicated [Dyspnea: Grade 1 - Shortness of breath with moderate exertion] : Dyspnea: Grade 1 - Shortness of breath with moderate exertion [Nausea: Grade 1 - Loss of appetite without alteration in eating habits] : Nausea: Grade 1 - Loss of appetite without alteration in eating habits [Pruritus: Grade 0] : Pruritus: Grade 0 [Skin Hyperpigmentation: Grade 0] : Skin Hyperpigmentation: Grade 0 [Dermatitis Radiation: Grade 0] : Dermatitis Radiation: Grade 0 [de-identified] : post chemo  [FreeTextEntry1] : baseline [FreeTextEntry2] : baseline

## 2025-02-05 NOTE — VITALS
[Maximal Pain Intensity: 7/10] : 7/10 [Least Pain Intensity: 3/10] : 3/10 [Pain Description/Quality: ___] : Pain description/quality: [unfilled] [Pain Duration: ___] : Pain duration: [unfilled] [Pain Location: ___] : Pain Location: [unfilled] [Pain Interferes with ADLs] : Pain interferes with activities of daily living. [Maximal Pain Intensity: 5/10] : 5/10 [Least Pain Intensity: 2/10] : 2/10 [80: Normal activity with effort; some signs or symptoms of disease.] : 80: Normal activity with effort; some signs or symptoms of disease.

## 2025-02-05 NOTE — REVIEW OF SYSTEMS
[Dyspepsia: Grade 0] : Dyspepsia: Grade 0 [Dysphagia: Grade 0] : Dysphagia: Grade 0 [Esophagitis: Grade 0] : Esophagitis: Grade 0 [Nausea: Grade 0] : Nausea: Grade 0 [Vomiting: Grade 0] : Vomiting: Grade 0 [Fatigue: Grade 1 - Fatigue relieved by rest] : Fatigue: Grade 1 - Fatigue relieved by rest [Cough: Grade 1 - Mild symptoms; nonprescription intervention indicated] : Cough: Grade 1 - Mild symptoms; nonprescription intervention indicated [Dyspnea: Grade 1 - Shortness of breath with moderate exertion] : Dyspnea: Grade 1 - Shortness of breath with moderate exertion [Nausea: Grade 1 - Loss of appetite without alteration in eating habits] : Nausea: Grade 1 - Loss of appetite without alteration in eating habits [Pruritus: Grade 0] : Pruritus: Grade 0 [Skin Hyperpigmentation: Grade 0] : Skin Hyperpigmentation: Grade 0 [Dermatitis Radiation: Grade 0] : Dermatitis Radiation: Grade 0 [de-identified] : post chemo  [FreeTextEntry1] : baseline [FreeTextEntry2] : baseline

## 2025-02-05 NOTE — ASSESSMENT
Refill request from patient for below medications  Last office visit 4/10/18  Next Office Visit 4/12/18  Last refilled on Hydro- 4/18/17, Clopid 5/2/17, Tamsul 10/2/17, Atorv 5/2/17    Refilled per protocol  clopidogrel (PLAVIX) 75 MG tablet 90 tablet 3 4/25/2018     Sig - Route: Take 1 tablet by mouth daily. - Oral      hydrochlorothiazide (HYDRODIURIL) 25 MG tablet 90 tablet 3 4/25/2018     Sig - Route: Take 1 tablet by mouth daily.      tamsulosin (FLOMAX) 0.4 MG Cap 90 capsule 3 4/25/2018 4/25/2019    Sig - Route: Take 1 capsule by mouth daily after a meal. - Oral      atorvastatin (LIPITOR) 80 MG tablet 90 tablet 3 5/2/2017 4/25/2018    Sig: Take 1 tablet nightly           [FreeTextEntry1] : -

## 2025-02-05 NOTE — HISTORY OF PRESENT ILLNESS
[FreeTextEntry1] : Mr. Foss is a 62 yo man with a history of Stage IA2 [id number] adenocarcinoma of the lung, treated Rt VATS in 2019 who now presents with a biopsy proven metastatic lesion in his right 10th rib.  He went for his surveillance CT in September 2024 which showed a soft tissue mass-like thickening around the 10th rib. A PET/CT confirmed that this lesion was FDG-avid, concerning for metastasis. He underwent a biopsy of the lesion on 11/12/2024 which revealed NSCLC, favor adenocarcinoma of primary pulmonary origin but without any other areas of metastasis or josselin disease.  We discussed his pathology and imaging to date. I recommended an MRI of the brain to complete his staging imaging. He will see Dr. Haq for consultation for systemic therapy for his metastatic disease. If this is truly an oligometastatic lesion, he would benefit from SBRT 40 Gy/5 fx for improved pain and disease control which he completed on 1/6/2025 2/5/2025: Patient presents to clinic for follow-up s/p 40Gy/5fx to the 10th rib. He had a CT-Chest yesterday 2/4/25 report pending. He reports some improvement in rib pain of appx 25% and states 5/10. Completed C3 of carbo/alimta/keytruda on 1/30/25 which he states he has nausea without vomiting, however, he has not taken any antiemetics.  Resp symptoms stable w/o c/o SOB at rest, ABERNATHY at his baseline.

## 2025-02-05 NOTE — HISTORY OF PRESENT ILLNESS
[FreeTextEntry1] : Mr. Foss is a 60 yo man with a history of Stage IA2 [id number] adenocarcinoma of the lung, treated Rt VATS in 2019 who now presents with a biopsy proven metastatic lesion in his right 10th rib.  He went for his surveillance CT in September 2024 which showed a soft tissue mass-like thickening around the 10th rib. A PET/CT confirmed that this lesion was FDG-avid, concerning for metastasis. He underwent a biopsy of the lesion on 11/12/2024 which revealed NSCLC, favor adenocarcinoma of primary pulmonary origin but without any other areas of metastasis or josselin disease.  We discussed his pathology and imaging to date. I recommended an MRI of the brain to complete his staging imaging. He will see Dr. Haq for consultation for systemic therapy for his metastatic disease. If this is truly an oligometastatic lesion, he would benefit from SBRT 40 Gy/5 fx for improved pain and disease control which he completed on 1/6/2025 2/5/2025: Patient presents to clinic for follow-up s/p 40Gy/5fx to the 10th rib. He had a CT-Chest yesterday 2/4/25 report pending. He reports some improvement in rib pain of appx 25% and states 5/10. Completed C3 of carbo/alimta/keytruda on 1/30/25 which he states he has nausea without vomiting, however, he has not taken any antiemetics.  Resp symptoms stable w/o c/o SOB at rest, ABERNATHY at his baseline.

## 2025-02-11 NOTE — RESULTS/DATA
[FreeTextEntry1] : - CT Chest 9/21/24: Since September 16, 2023; Interval increase in right 10th rib sclerosis with surrounding soft tissue masslike thickening, concerning for metastatic disease. Post right upper lobectomy with expected stable postsurgical changes.  - PET/CT 10/5/24:   1. Right rib with cortical disruptions and surrounding soft tissue component associated with diffuse increased activity compatible with malignant processes. Tissue confirmation recommended. 2. No FDG avid lung lesions. Stable nonavid left fissural nodule; right lower lobe subpleural nodule not visualized on low-dose CT which may be due to differences in spatial resolution. 3. No other suspicious FDG avid lesions in the remaining field of view.  Images Reviewed/Interpreted:  -MRI Brain 12/5/24:  No evidence of intracranial metastasis.  -CT Chest 2/4/25:  Stable metastatic lesion within the right 10th rib. No new lesion

## 2025-02-11 NOTE — PHYSICAL EXAM
[de-identified] : No icterus  [de-identified] : MMM O/P Clear [de-identified] : Supple No LAD  [de-identified] : CTAB [de-identified] : S1 S2 [de-identified] : No edema  [de-identified] : No spine tenderness [de-identified] : Ambulatory

## 2025-02-11 NOTE — HISTORY OF PRESENT ILLNESS
[de-identified] : Mr. Foss is a 61-year-old man, former smoker, who was found with RUL lung nodule on CT lung screening in July 2019. In August 2019 he underwent right upper lobectomy revealing a 1.2cm lung adenocarcinoma, acinar predominant, with negative margins and no lymph node involvement, and a 0.35cm minimally invasive adenocarcinoma in the same lobe. He was monitored with surveillance scans. CT Chest on 9/16/23 revealed a new fracture of the right 10th rib with underlying lucency. CT Chest 9/21/24 showed increase in the right 10th rib sclerosis with surrounding soft tissue masslike thickening, concerning for metastatic disease. PET CT 10/5/24 reported diffuse activity of the right 10th rib with cortical disruptions and surrounding soft tissue component measuring 3.1 x 1.7cm, compatible with malignant process. No other suspicious FDG avid lesions. He underwent CT guided core biopsy of the right 10th rib on 11/13/24, with pathology interpreted as showing metastatic non-small cell carcinoma, favor adenocarcinoma or primary pulmonary origin; tumor tested PDL1 Low-Positive. Foundation CDx did not show any actionable mutations (RB1 loss, STK11 E293, TP53). He started first line systemic therapy with combination chemotherapy and immunotherapy with carboplatin/pemetrexed and pembrolizumab on 12/19/24. He started SBRT to the 10th rib on 12/23/24 x 5 Fx completed on 1/6/25.  Achieved overall stable disease.  [de-identified] : Right 10th rib, CT-guided core biopsy: metastatic non-small cell carcinoma, favor adenocarcinoma of primary pulmonary origin.  PD-L1 low-positive at 20%. [de-identified] : Patient started first-line systemic therapy with combination chemotherapy and immunotherapy with carboplatin/pemetrexed and pembrolizumab on 12/19/2024. Patient completed SBRT to the right 10th rib in 5 Fx 1/6/25. Patient is status post C3 of systemic therapy on 1/30.  He reports the pain in his rib is easing up.  He is still using the gabapentin 3 times daily and meloxicam at bedtime; he is concerned about not taking the medications. He reports significant fatigue however he continues to work full-time.  He also acknowledges that he has sleep apnea which adds to his tiredness and he is not using his mouthpiece.  Reports excellent appetite.   Reports experiencing mild nausea following treatment for which antiemetic was not helpful.  Also has transient hiccups.  Experienced constipation for which MiraLAX helps.  Restaging CT chest 2/4/2025 with overall stable disease and no new findings.

## 2025-02-11 NOTE — PHYSICAL EXAM
[de-identified] : No icterus  [de-identified] : MMM O/P Clear [de-identified] : Supple No LAD  [de-identified] : CTAB [de-identified] : S1 S2 [de-identified] : No edema  [de-identified] : No spine tenderness [de-identified] : Ambulatory

## 2025-02-11 NOTE — HISTORY OF PRESENT ILLNESS
[de-identified] : Mr. Foss is a 61-year-old man, former smoker, who was found with RUL lung nodule on CT lung screening in July 2019. In August 2019 he underwent right upper lobectomy revealing a 1.2cm lung adenocarcinoma, acinar predominant, with negative margins and no lymph node involvement, and a 0.35cm minimally invasive adenocarcinoma in the same lobe. He was monitored with surveillance scans. CT Chest on 9/16/23 revealed a new fracture of the right 10th rib with underlying lucency. CT Chest 9/21/24 showed increase in the right 10th rib sclerosis with surrounding soft tissue masslike thickening, concerning for metastatic disease. PET CT 10/5/24 reported diffuse activity of the right 10th rib with cortical disruptions and surrounding soft tissue component measuring 3.1 x 1.7cm, compatible with malignant process. No other suspicious FDG avid lesions. He underwent CT guided core biopsy of the right 10th rib on 11/13/24, with pathology interpreted as showing metastatic non-small cell carcinoma, favor adenocarcinoma or primary pulmonary origin; tumor tested PDL1 Low-Positive. Foundation CDx did not show any actionable mutations (RB1 loss, STK11 E293, TP53). He started first line systemic therapy with combination chemotherapy and immunotherapy with carboplatin/pemetrexed and pembrolizumab on 12/19/24. He started SBRT to the 10th rib on 12/23/24 x 5 Fx completed on 1/6/25.  Achieved overall stable disease.  [de-identified] : Right 10th rib, CT-guided core biopsy: metastatic non-small cell carcinoma, favor adenocarcinoma of primary pulmonary origin.  PD-L1 low-positive at 20%. [de-identified] : Patient started first-line systemic therapy with combination chemotherapy and immunotherapy with carboplatin/pemetrexed and pembrolizumab on 12/19/2024. Patient completed SBRT to the right 10th rib in 5 Fx 1/6/25. Patient is status post C3 of systemic therapy on 1/30.  He reports the pain in his rib is easing up.  He is still using the gabapentin 3 times daily and meloxicam at bedtime; he is concerned about not taking the medications. He reports significant fatigue however he continues to work full-time.  He also acknowledges that he has sleep apnea which adds to his tiredness and he is not using his mouthpiece.  Reports excellent appetite.   Reports experiencing mild nausea following treatment for which antiemetic was not helpful.  Also has transient hiccups.  Experienced constipation for which MiraLAX helps.  Restaging CT chest 2/4/2025 with overall stable disease and no new findings.

## 2025-02-11 NOTE — ASSESSMENT
[FreeTextEntry1] : 61M with NSCLC with adenocarcinoma histology with originally a stage IA tumor status post right upper lobectomy in August 2019.  Followed with surveillance.  Developed recurrent/metastatic disease involving the right 10th rib.  Tumor tested PD-L1 low-positive and negative for actionable mutations (RB1 loss, STK11 E293, TP53).  His case was presented at thoracic tumor board 11/27/24, plan for SBRT to rib metastasis for pain control. Systemic therapy recommended. Consideration of chest wall resection with reconstruction depending on clinical course. MRI brain revealed no intracranial metastases. He started first line systemic therapy with combination chemotherapy and immunotherapy with carboplatin, pemetrexed, and pembrolizumab on 12/19/24. He received SBRT to the right 10th rib in 5 Fx 12/23/24 - 1/6/25.  Achieved overall stable disease.   Retaging CT Chest 2/4/25 with stable disease.   Now s/p C3 on 1/30.   Recommend: - Continue with first line systemic therapy as scheduled.  For C4 next week.   -Repeat labs today.  Monitor periodic TFT's while on immunotherapy.  - Continue daily folic acid, dexamethasone in the gurmeet-chemo setting and vitamin B12 administered every 3 cycles while on pemetrexed - Completed SBRT to right 10th rib on 1/6/25. Continue follow up with Rad-Onc - Follows with outpatient pain management Dr. Jackson, on meloxicam and gabapentin. Given improvement in pain, discussed stopping the meloxicam which she only takes at bedtime currently.  Depending on his symptoms, can then consider slow titration down of the gabapentin. - Continue management of hypothyroidism by PMD. He recently followed up and continues levothyroxine 175mcg daily.  - Declined psych-onc referral for anxiety exacerbated by cancer diagnosis and treatment.  - Follow-up following C4 of systemic therapy midcycle.  Will then plan to proceed with maintenance pemetrexed and pembrolizumab.  Patient can then follow-up on the day of (pretreatment) each cycle beginning with C6. - Given that he has a limited disease burden, depending upon his clinical course, could consider treatment for up to a year followed by observation if he is without evidence of disease progression.  Length of treatment TBD depending upon clinical course. Checkout form provided to schedule appointments through May.  *Can plan on obtaining his next restaging scan in LATE May prior to a planned Early June visit with MD*

## 2025-02-15 NOTE — HISTORY OF PRESENT ILLNESS
[TextBox_4] : F/u   Overall feeling well; no respiratory complaints reported at this time   Currently on chemo and RT for recently diagnosed stage IV lung cancer.

## 2025-02-15 NOTE — SIGNATURES
[TextEntry] : This note was written by Renee Campbell on 02/13/2025 acting as medical scribe for Dr. Jeannine Sharma. I, Dr. Jeannine Sharma, have read and attest that all the information, medical decision making and discharge instructions within are true and accurate.

## 2025-02-15 NOTE — PROCEDURE
[FreeTextEntry1] : CT Chest No Cont             Final  No Documents Attached    	 EXAM: [id number] - CT CHEST  - ORDERED BY: MEKA MONTALVO   PROCEDURE DATE:  02/04/2025    INTERPRETATION:  INDICATION: oligo metastatic lung cancer on systemic treatment TECHNIQUE: Unenhanced CT of the chest. Coronal, sagittal, and MIP images were reconstructed and reviewed. COMPARISON: PET-CT 10/5/2024. Chest CT 9/21/2024  FINDINGS:  AIRWAYS, LUNGS, PLEURA: Status post right upper lobectomy with stable postsurgical changes present. Stable 3 mm lentiform nodule along the left fissure likely benign pulmonary lymph node. Stable small calcified nodule within the upper lobe. No new or enlarging nodule.  LYMPH NODES, MEDIASTINUM: No lymphadenopathy.  HEART, VESSELS: Heart size is normal. No pericardial effusion. Thoracic aorta normal in diameter. Coronary calcification.  UPPER ABDOMEN: Within normal limits.  CHEST WALL, BONES: Stable fracture deformity of the right lateral 8th rib. Stable destructive changes of the right 10th rib with surrounding soft tissue some component  LOWER NECK: Within normal limits.  IMPRESSION:  Stable metastatic lesion within the right 10th rib. No new lesion  --- End of Report ---       LATRICIA ALAS MD; Attending Radiologist This document has been electronically signed. Feb 7 2025  2:27PM  Ordered by: MEKA MONTALVO       Collected/Examined: [id number] 07:56AM       Verified by: MEKA MONTALVO [id number] 03:41PM       Result Communication: No patient communication needed at this time; Stage: Final       Resulted: [id number] 02:13PM       Last Updated: 10Feb2025 03:41PM       Accession: [id number]

## 2025-02-15 NOTE — PROCEDURE
[FreeTextEntry1] : CT Chest No Cont             Final  No Documents Attached    	 EXAM: [id number] - CT CHEST  - ORDERED BY: MEKA MONTALVO   PROCEDURE DATE:  02/04/2025    INTERPRETATION:  INDICATION: oligo metastatic lung cancer on systemic treatment TECHNIQUE: Unenhanced CT of the chest. Coronal, sagittal, and MIP images were reconstructed and reviewed. COMPARISON: PET-CT 10/5/2024. Chest CT 9/21/2024  FINDINGS:  AIRWAYS, LUNGS, PLEURA: Status post right upper lobectomy with stable postsurgical changes present. Stable 3 mm lentiform nodule along the left fissure likely benign pulmonary lymph node. Stable small calcified nodule within the upper lobe. No new or enlarging nodule.  LYMPH NODES, MEDIASTINUM: No lymphadenopathy.  HEART, VESSELS: Heart size is normal. No pericardial effusion. Thoracic aorta normal in diameter. Coronary calcification.  UPPER ABDOMEN: Within normal limits.  CHEST WALL, BONES: Stable fracture deformity of the right lateral 8th rib. Stable destructive changes of the right 10th rib with surrounding soft tissue some component  LOWER NECK: Within normal limits.  IMPRESSION:  Stable metastatic lesion within the right 10th rib. No new lesion  --- End of Report ---       LATRICIA ALAS MD; Attending Radiologist This document has been electronically signed. Feb 7 2025  2:27PM  Ordered by: MEKA MONTALVO       Collected/Examined: [id number] 07:56AM       Verified by: MEKA MONTALVO [id number] 03:41PM       Result Communication: No patient communication needed at this time; Stage: Final       Resulted: [id number] 02:13PM       Last Updated: 10Feb2025 03:41PM       Accession: [id number]

## 2025-02-15 NOTE — ASSESSMENT
[FreeTextEntry1] : Dr. Sharma reviewed with JULIA his chest CT scan dated 02/04/2025 in office today. The study shows stable metastatic lesion within the right 10th rib and no new lesion.   Dr. Sharma's recommendation: Advised Angel to be continue to use oral appliance for his underlying history of obstructive sleep apnea. Thoracic surgery follow-up with Dr. Ludwig Bee.  Hematology/oncology and radiation oncology follow-up for his stage IV lung cancer care.  Return for pulmonary follow-up in 3 months.

## 2025-02-24 NOTE — PLAN
[FreeTextEntry1] : discussed supportive care monitor for worsening of symptoms, instructed to go to ER if noted  patient requested zpak - states will only use if no improvement noted  explained to patient that his infec is likely viral but due to immune compromised status can take

## 2025-02-24 NOTE — HISTORY OF PRESENT ILLNESS
[Home] : at home, [unfilled] , at the time of the visit. [Medical Office: ([facility])___] : at the medical office located in  [Telehealth (audio & video)] : This visit was provided via telehealth using real-time 2-way audio visual technology. [Verbal consent obtained from patient] : the patient, [unfilled] [FreeTextEntry8] : JULIA MONTERO is a 61 year old male presenting with cold like symptoms.  PMH of lung cancer with mets to bone  Recent chemo txt 2/20/2025 - states does usually feel fatigues after Patient temp this AM 99.6, no shortness of breath  +nasal congestion

## 2025-03-06 NOTE — HISTORY OF PRESENT ILLNESS
[Disease: _____________________] : Disease: [unfilled] [de-identified] : Mr. Foss is a 61-year-old man, former smoker, who was found with RUL lung nodule on CT lung screening in July 2019. In August 2019 he underwent right upper lobectomy revealing a 1.2cm lung adenocarcinoma, acinar predominant, with negative margins and no lymph node involvement, and a 0.35cm minimally invasive adenocarcinoma in the same lobe. He was monitored with surveillance scans. CT Chest on 9/16/23 revealed a new fracture of the right 10th rib with underlying lucency. CT Chest 9/21/24 showed increase in the right 10th rib sclerosis with surrounding soft tissue masslike thickening, concerning for metastatic disease. PET CT 10/5/24 reported diffuse activity of the right 10th rib with cortical disruptions and surrounding soft tissue component measuring 3.1 x 1.7cm, compatible with malignant process. No other suspicious FDG avid lesions. He underwent CT guided core biopsy of the right 10th rib on 11/13/24, with pathology interpreted as showing metastatic non-small cell carcinoma, favor adenocarcinoma or primary pulmonary origin; tumor tested PDL1 Low-Positive. Foundation CDx did not show any actionable mutations (RB1 loss, STK11 E293, TP53). He started first line systemic therapy with combination chemotherapy and immunotherapy with carboplatin/pemetrexed and pembrolizumab on 12/19/24. He started SBRT to the 10th rib on 12/23/24 x 5 Fx completed on 1/6/25.  Achieved overall stable disease.  Completed 4 cycles of triplet therapy in Feb 2025.   [de-identified] : Right 10th rib, CT-guided core biopsy: metastatic non-small cell carcinoma, favor adenocarcinoma of primary pulmonary origin.  PD-L1 low-positive at 20%. [de-identified] : Patient started first-line systemic therapy with combination chemotherapy and immunotherapy with carboplatin/pemetrexed and pembrolizumab on 12/19/2024. Patient completed SBRT to the right 10th rib in 5 Fx 1/6/25.  Achieved overall stable disease.  Patient is status post C4 of systemic therapy on 2/20.  Following his most recent cycle of chemotherapy, reports more malaise and fatigue along with nausea.  He did not take the antiemetic medications.  He is feeling improved now.  He has an ongoing cough, productive of clear phlegm. The pain in his rib is greatly improved.  Utilizing gabapentin 3 times daily and meloxicam. Continues to work full-time.

## 2025-03-06 NOTE — PHYSICAL EXAM
[Restricted in physically strenuous activity but ambulatory and able to carry out work of a light or sedentary nature] : Status 1- Restricted in physically strenuous activity but ambulatory and able to carry out work of a light or sedentary nature, e.g., light house work, office work [Normal] : affect appropriate [de-identified] : No icterus  [de-identified] : MMM O/P Clear [de-identified] : Supple No LAD  [de-identified] : CTAB [de-identified] : S1 S2 [de-identified] : No edema  [de-identified] : No spine tenderness [de-identified] : Ambulatory

## 2025-03-06 NOTE — PHYSICAL EXAM
[Restricted in physically strenuous activity but ambulatory and able to carry out work of a light or sedentary nature] : Status 1- Restricted in physically strenuous activity but ambulatory and able to carry out work of a light or sedentary nature, e.g., light house work, office work [Normal] : affect appropriate [de-identified] : No icterus  [de-identified] : MMM O/P Clear [de-identified] : Supple No LAD  [de-identified] : CTAB [de-identified] : S1 S2 [de-identified] : No edema  [de-identified] : No spine tenderness [de-identified] : Ambulatory

## 2025-03-06 NOTE — ASSESSMENT
[Medication(s)] : Medication(s) [FreeTextEntry1] : 61M with NSCLC with adenocarcinoma histology with originally a stage IA tumor status post right upper lobectomy in August 2019.  Followed with surveillance.  Developed recurrent/metastatic disease involving the right 10th rib.  Tumor tested PD-L1 low-positive and negative for actionable mutations (RB1 loss, STK11 E293, TP53).  His case was presented at thoracic tumor board 11/27/24, plan for SBRT to rib metastasis for pain control. Systemic therapy recommended. Consideration of chest wall resection with reconstruction depending on clinical course. MRI brain revealed no intracranial metastases. He started first line systemic therapy with combination chemotherapy and immunotherapy with carboplatin, pemetrexed, and pembrolizumab on 12/19/24. He received SBRT to the right 10th rib in 5 Fx 12/23/24 - 1/6/25.  Achieved overall stable disease.   Retaging CT Chest 2/4/25 with stable disease.   Now s/p C4 on 2/20.   Recommend: - Continue with first line systemic therapy as scheduled.  Now that he has completed 4 cycles of triplet therapy, will discontinue the carboplatin and proceed with pemetrexed/pembrolizumab maintenance. For C5 next week. -Repeat labs today.  Monitor periodic TFT's while on immunotherapy.  - Continue daily folic acid, dexamethasone in the gurmeet-chemo setting and vitamin B12 administered every 3 cycles while on pemetrexed - Completed SBRT to right 10th rib on 1/6/25. Continue follow up with Rad-Onc - Follows with outpatient pain management Dr. Jackson, on meloxicam and gabapentin. Consider stopping the meloxicam which she only takes at bedtime currently and slow titration down of the gabapentin. - Continue management of hypothyroidism by PMD. On levothyroxine.   - Declined psych-onc referral for anxiety exacerbated by cancer diagnosis and treatment.  - Follow-up on the day of (pretreatment) each cycle beginning with C6. - Given that he has a limited disease burden, depending upon his clinical course, could consider treatment for up to a year followed by observation if he is without evidence of disease progression.  Length of treatment TBD depending upon clinical course. -Can plan on obtaining his next restaging scan in LATE May prior to Early June visit with MD*   Checkout form through June provided for patient to schedule.

## 2025-03-06 NOTE — RESULTS/DATA
[FreeTextEntry1] : - CT Chest 9/21/24: Since September 16, 2023; Interval increase in right 10th rib sclerosis with surrounding soft tissue masslike thickening, concerning for metastatic disease. Post right upper lobectomy with expected stable postsurgical changes.  - PET/CT 10/5/24:   1. Right rib with cortical disruptions and surrounding soft tissue component associated with diffuse increased activity compatible with malignant processes. Tissue confirmation recommended. 2. No FDG avid lung lesions. Stable nonavid left fissural nodule; right lower lobe subpleural nodule not visualized on low-dose CT which may be due to differences in spatial resolution. 3. No other suspicious FDG avid lesions in the remaining field of view.  -MRI Brain 12/5/24:  No evidence of intracranial metastasis.  -CT Chest 2/4/25:  Stable metastatic lesion within the right 10th rib. No new lesion

## 2025-03-06 NOTE — HISTORY OF PRESENT ILLNESS
[Disease: _____________________] : Disease: [unfilled] [de-identified] : Mr. Foss is a 61-year-old man, former smoker, who was found with RUL lung nodule on CT lung screening in July 2019. In August 2019 he underwent right upper lobectomy revealing a 1.2cm lung adenocarcinoma, acinar predominant, with negative margins and no lymph node involvement, and a 0.35cm minimally invasive adenocarcinoma in the same lobe. He was monitored with surveillance scans. CT Chest on 9/16/23 revealed a new fracture of the right 10th rib with underlying lucency. CT Chest 9/21/24 showed increase in the right 10th rib sclerosis with surrounding soft tissue masslike thickening, concerning for metastatic disease. PET CT 10/5/24 reported diffuse activity of the right 10th rib with cortical disruptions and surrounding soft tissue component measuring 3.1 x 1.7cm, compatible with malignant process. No other suspicious FDG avid lesions. He underwent CT guided core biopsy of the right 10th rib on 11/13/24, with pathology interpreted as showing metastatic non-small cell carcinoma, favor adenocarcinoma or primary pulmonary origin; tumor tested PDL1 Low-Positive. Foundation CDx did not show any actionable mutations (RB1 loss, STK11 E293, TP53). He started first line systemic therapy with combination chemotherapy and immunotherapy with carboplatin/pemetrexed and pembrolizumab on 12/19/24. He started SBRT to the 10th rib on 12/23/24 x 5 Fx completed on 1/6/25.  Achieved overall stable disease.  Completed 4 cycles of triplet therapy in Feb 2025.   [de-identified] : Right 10th rib, CT-guided core biopsy: metastatic non-small cell carcinoma, favor adenocarcinoma of primary pulmonary origin.  PD-L1 low-positive at 20%. [de-identified] : Patient started first-line systemic therapy with combination chemotherapy and immunotherapy with carboplatin/pemetrexed and pembrolizumab on 12/19/2024. Patient completed SBRT to the right 10th rib in 5 Fx 1/6/25.  Achieved overall stable disease.  Patient is status post C4 of systemic therapy on 2/20.  Following his most recent cycle of chemotherapy, reports more malaise and fatigue along with nausea.  He did not take the antiemetic medications.  He is feeling improved now.  He has an ongoing cough, productive of clear phlegm. The pain in his rib is greatly improved.  Utilizing gabapentin 3 times daily and meloxicam. Continues to work full-time.

## 2025-03-24 NOTE — HISTORY OF PRESENT ILLNESS
[FreeTextEntry1] : pt presents for f/u pt presents for f/u cv issues pt with non obstructive cad .pt feels well rib pain essentially resolved .pt receiving chemo s/p radiation .pt with nasal congestion and cough x 1 week . pt with rare nasal discomfort No fever, rash, or recent illness.  No joint pain/swelling/stiffness.  No eye pain/redness/change in vision.  No sores in the mouth or nose.  No difficulty swallowing.  No chest pain or shortness of breath.  No abdominal complaints or weight loss.  No weakness.  No headaches or focal neurological deficits.  No urinary changes.  No other new symptoms. fever no chills

## 2025-04-02 NOTE — HISTORY OF PRESENT ILLNESS
[Disease: _____________________] : Disease: [unfilled] [de-identified] : Mr. Foss is a 61-year-old man, former smoker, who was found with RUL lung nodule on CT lung screening in July 2019. In August 2019 he underwent right upper lobectomy revealing a 1.2cm lung adenocarcinoma, acinar predominant, with negative margins and no lymph node involvement, and a 0.35cm minimally invasive adenocarcinoma in the same lobe. He was monitored with surveillance scans. CT Chest on 9/16/23 revealed a new fracture of the right 10th rib with underlying lucency. CT Chest 9/21/24 showed increase in the right 10th rib sclerosis with surrounding soft tissue masslike thickening, concerning for metastatic disease. PET CT 10/5/24 reported diffuse activity of the right 10th rib with cortical disruptions and surrounding soft tissue component measuring 3.1 x 1.7cm, compatible with malignant process. No other suspicious FDG avid lesions. He underwent CT guided core biopsy of the right 10th rib on 11/13/24, with pathology interpreted as showing metastatic non-small cell carcinoma, favor adenocarcinoma or primary pulmonary origin; tumor tested PDL1 Low-Positive. Foundation CDx did not show any actionable mutations (RB1 loss, STK11 E293, TP53). He started first line systemic therapy with combination chemotherapy and immunotherapy with carboplatin/pemetrexed and pembrolizumab on 12/19/24. He started SBRT to the 10th rib on 12/23/24 x 5 Fx completed on 1/6/25.  Achieved overall stable disease.  Completed 4 cycles of triplet therapy in Feb 2025. Continued with maintenance pemetrexed and pembrolizumab.  [de-identified] : Right 10th rib, CT-guided core biopsy: metastatic non-small cell carcinoma, favor adenocarcinoma of primary pulmonary origin.  PD-L1 low-positive at 20%. [de-identified] : Patient started first-line systemic therapy with combination chemotherapy and immunotherapy with carboplatin/pemetrexed and pembrolizumab on 12/19/2024. Patient completed SBRT to the right 10th rib in 5 Fx 1/6/25.  Achieved overall stable disease.  Completed 4 cycles of carboplatin, pemetrexed, and pembrolizumab on 2/20/25 and subsequently continued with maintenance pemetrexed and pembrolizumab.  Mr. Foss is seen prior to C6 pemetrexed and pembrolizumab and is accompanied by his son Hiram.  States he felt more nausea after switching from 3 infusions to 2 infusion drugs - discussed that may be due to carboplatin being discontinued along with the additional IV antiemetics that are usually ordered along with it. Advised to use the oral metoclopramide as needed.  States he only had very mild hiccups a few times after the last cycle.  He reports having a cold with cough and nasal congestion when he went to see his cardiologist recently, and that he had testing done including CXR, nasal swab, and was given doxycycline antibiotics and cough medication. CXR and swab results were negative and pt states symptoms have mostly resolved. He reports minimal pain to the right 10th rib, that pain improved by 80% after radiation. States he decreased gabapentin use from 4x/day to once prior to bedime.  He has meloxicam which he takes as needed.  He asks whether PET CT can be done for next restaging scan as he is concerned that metastases can be missed with CT. He requests that we not ask him whether he has specific symptoms/side effects as he feels he will start having the side effects through the power of suggestion.  Pt otherwise denies any side effects to treatment and reports adherence to folic acid. States he is a baby with injections and is very scared of getting the B12 shots - discussed this is needed due to the pemetrexed.

## 2025-04-02 NOTE — HISTORY OF PRESENT ILLNESS
[Disease: _____________________] : Disease: [unfilled] [de-identified] : Mr. Foss is a 61-year-old man, former smoker, who was found with RUL lung nodule on CT lung screening in July 2019. In August 2019 he underwent right upper lobectomy revealing a 1.2cm lung adenocarcinoma, acinar predominant, with negative margins and no lymph node involvement, and a 0.35cm minimally invasive adenocarcinoma in the same lobe. He was monitored with surveillance scans. CT Chest on 9/16/23 revealed a new fracture of the right 10th rib with underlying lucency. CT Chest 9/21/24 showed increase in the right 10th rib sclerosis with surrounding soft tissue masslike thickening, concerning for metastatic disease. PET CT 10/5/24 reported diffuse activity of the right 10th rib with cortical disruptions and surrounding soft tissue component measuring 3.1 x 1.7cm, compatible with malignant process. No other suspicious FDG avid lesions. He underwent CT guided core biopsy of the right 10th rib on 11/13/24, with pathology interpreted as showing metastatic non-small cell carcinoma, favor adenocarcinoma or primary pulmonary origin; tumor tested PDL1 Low-Positive. Foundation CDx did not show any actionable mutations (RB1 loss, STK11 E293, TP53). He started first line systemic therapy with combination chemotherapy and immunotherapy with carboplatin/pemetrexed and pembrolizumab on 12/19/24. He started SBRT to the 10th rib on 12/23/24 x 5 Fx completed on 1/6/25.  Achieved overall stable disease.  Completed 4 cycles of triplet therapy in Feb 2025. Continued with maintenance pemetrexed and pembrolizumab.  [de-identified] : Right 10th rib, CT-guided core biopsy: metastatic non-small cell carcinoma, favor adenocarcinoma of primary pulmonary origin.  PD-L1 low-positive at 20%. [de-identified] : Patient started first-line systemic therapy with combination chemotherapy and immunotherapy with carboplatin/pemetrexed and pembrolizumab on 12/19/2024. Patient completed SBRT to the right 10th rib in 5 Fx 1/6/25.  Achieved overall stable disease.  Completed 4 cycles of carboplatin, pemetrexed, and pembrolizumab on 2/20/25 and subsequently continued with maintenance pemetrexed and pembrolizumab.  Mr. Foss is seen prior to C6 pemetrexed and pembrolizumab and is accompanied by his son Hiram.  States he felt more nausea after switching from 3 infusions to 2 infusion drugs - discussed that may be due to carboplatin being discontinued along with the additional IV antiemetics that are usually ordered along with it. Advised to use the oral metoclopramide as needed.  States he only had very mild hiccups a few times after the last cycle.  He reports having a cold with cough and nasal congestion when he went to see his cardiologist recently, and that he had testing done including CXR, nasal swab, and was given doxycycline antibiotics and cough medication. CXR and swab results were negative and pt states symptoms have mostly resolved. He reports minimal pain to the right 10th rib, that pain improved by 80% after radiation. States he decreased gabapentin use from 4x/day to once prior to bedime.  He has meloxicam which he takes as needed.  He asks whether PET CT can be done for next restaging scan as he is concerned that metastases can be missed with CT. He requests that we not ask him whether he has specific symptoms/side effects as he feels he will start having the side effects through the power of suggestion.  Pt otherwise denies any side effects to treatment and reports adherence to folic acid. States he is a baby with injections and is very scared of getting the B12 shots - discussed this is needed due to the pemetrexed.

## 2025-04-02 NOTE — PHYSICAL EXAM
[Restricted in physically strenuous activity but ambulatory and able to carry out work of a light or sedentary nature] : Status 1- Restricted in physically strenuous activity but ambulatory and able to carry out work of a light or sedentary nature, e.g., light house work, office work [Normal] : affect appropriate [de-identified] : No icterus  [de-identified] : MMM O/P Clear. No mouth sores.  [de-identified] : Supple No LAD  [de-identified] : CTAB [de-identified] : S1 S2 [de-identified] : No edema  [de-identified] : No spine tenderness [de-identified] : Ambulatory without assistive device.  [de-identified] : No rash or skin lesions.

## 2025-04-02 NOTE — ASSESSMENT
[FreeTextEntry1] : 61M with NSCLC with adenocarcinoma histology with originally a stage IA tumor status post right upper lobectomy in August 2019.  Followed with surveillance.  Developed recurrent/metastatic disease involving the right 10th rib.  Tumor tested PD-L1 low-positive and negative for actionable mutations (RB1 loss, STK11 E293, TP53).  His case was presented at thoracic tumor board 11/27/24, plan for SBRT to rib metastasis for pain control. Systemic therapy recommended. Consideration of chest wall resection with reconstruction depending on clinical course. MRI brain revealed no intracranial metastases. He started first line systemic therapy with combination chemotherapy and immunotherapy with carboplatin, pemetrexed, and pembrolizumab on 12/19/24. He received SBRT to the right 10th rib in 5 Fx 12/23/24 - 1/6/25.  Achieved overall stable disease.   Restaging CT Chest 2/4/25 with stable disease.   Recommend: - Continue fist line systemic therapy with pemetrexed/pembrolizumab maintenance. For C6 today.  - Repeat labs today.  Monitor periodic TFT's while on immunotherapy.  - Continue daily folic acid, dexamethasone in the gurmeet-chemo setting and vitamin B12 administered every 3 cycles while on pemetrexed - Completed SBRT to right 10th rib on 1/6/25 with improvement of pain. Continue follow up with Rad-Onc - Follows with outpatient pain management Dr. Jackson, on meloxicam and gabapentin. He has titrated down the gabapentin to once a night and uses meloxicam only as needed.  - Nausea. Use metoclopramide 10mg by mouth Q6hrs as needed for N/V. - Hiccups. Mild and improving. Possibly related to dexamethasone.  - Continue management of hypothyroidism by PMD. On levothyroxine.   - Declined psych-onc referral for anxiety exacerbated by cancer diagnosis and treatment.  - Follow-up on the day of (pretreatment) each cycle - Given that he has a limited disease burden, depending upon his clinical course, could consider treatment for up to a year followed by observation if he is without evidence of disease progression.  Length of treatment TBD depending upon clinical course. -Can plan on obtaining his next restaging scan in LATE May prior to Early June visit with MD*   Scheduled through June  [Medication(s)] : Medication(s)

## 2025-04-02 NOTE — PHYSICAL EXAM
[Restricted in physically strenuous activity but ambulatory and able to carry out work of a light or sedentary nature] : Status 1- Restricted in physically strenuous activity but ambulatory and able to carry out work of a light or sedentary nature, e.g., light house work, office work [Normal] : affect appropriate [de-identified] : No icterus  [de-identified] : MMM O/P Clear. No mouth sores.  [de-identified] : Supple No LAD  [de-identified] : CTAB [de-identified] : S1 S2 [de-identified] : No edema  [de-identified] : No spine tenderness [de-identified] : Ambulatory without assistive device.  [de-identified] : No rash or skin lesions.

## 2025-04-25 NOTE — HISTORY OF PRESENT ILLNESS
[Disease: _____________________] : Disease: [unfilled] [de-identified] : Mr. Foss is a 61-year-old man, former smoker, who was found with RUL lung nodule on CT lung screening in July 2019. In August 2019 he underwent right upper lobectomy revealing a 1.2cm lung adenocarcinoma, acinar predominant, with negative margins and no lymph node involvement, and a 0.35cm minimally invasive adenocarcinoma in the same lobe. He was monitored with surveillance scans. CT Chest on 9/16/23 revealed a new fracture of the right 10th rib with underlying lucency. CT Chest 9/21/24 showed increase in the right 10th rib sclerosis with surrounding soft tissue masslike thickening, concerning for metastatic disease. PET CT 10/5/24 reported diffuse activity of the right 10th rib with cortical disruptions and surrounding soft tissue component measuring 3.1 x 1.7cm, compatible with malignant process. No other suspicious FDG avid lesions. He underwent CT guided core biopsy of the right 10th rib on 11/13/24, with pathology interpreted as showing metastatic non-small cell carcinoma, favor adenocarcinoma or primary pulmonary origin; tumor tested PDL1 Low-Positive. Foundation CDx did not show any actionable mutations (RB1 loss, STK11 E293, TP53). He started first line systemic therapy with combination chemotherapy and immunotherapy with carboplatin/pemetrexed and pembrolizumab on 12/19/24. He started SBRT to the 10th rib on 12/23/24 x 5 Fx completed on 1/6/25.  Achieved overall stable disease.  Completed 4 cycles of triplet therapy in Feb 2025. Continued with maintenance pemetrexed and pembrolizumab.  [de-identified] : Right 10th rib, CT-guided core biopsy: metastatic non-small cell carcinoma, favor adenocarcinoma of primary pulmonary origin.  PD-L1 low-positive at 20%. [de-identified] : Patient started first-line systemic therapy with combination chemotherapy and immunotherapy with carboplatin/pemetrexed and pembrolizumab on 12/19/2024. Patient completed SBRT to the right 10th rib in 5 Fx 1/6/25.  Achieved overall stable disease.  Completed 4 cycles of carboplatin, pemetrexed, and pembrolizumab on 2/20/25 and subsequently continued with maintenance pemetrexed and pembrolizumab.  Patient presents today prior to C7 a pemetrexed/pembrolizumab maintenance; his son is present. He reports fatigue along with a dry cough. He reports that he was treated with doxycycline about 2 weeks ago for suspected URI. Pain is improved and he titrated himself off all pain medications.

## 2025-04-25 NOTE — PHYSICAL EXAM
[Restricted in physically strenuous activity but ambulatory and able to carry out work of a light or sedentary nature] : Status 1- Restricted in physically strenuous activity but ambulatory and able to carry out work of a light or sedentary nature, e.g., light house work, office work [Normal] : affect appropriate [de-identified] : No icterus  [de-identified] : MMM O/P Clear. No mouth sores.  [de-identified] : Supple No LAD  [de-identified] : CTAB [de-identified] : S1 S2 [de-identified] : No edema  [de-identified] : No spine tenderness [de-identified] : Ambulatory without assistive device.  [de-identified] : No rash or skin lesions.

## 2025-04-25 NOTE — HISTORY OF PRESENT ILLNESS
[Disease: _____________________] : Disease: [unfilled] [de-identified] : Mr. Foss is a 61-year-old man, former smoker, who was found with RUL lung nodule on CT lung screening in July 2019. In August 2019 he underwent right upper lobectomy revealing a 1.2cm lung adenocarcinoma, acinar predominant, with negative margins and no lymph node involvement, and a 0.35cm minimally invasive adenocarcinoma in the same lobe. He was monitored with surveillance scans. CT Chest on 9/16/23 revealed a new fracture of the right 10th rib with underlying lucency. CT Chest 9/21/24 showed increase in the right 10th rib sclerosis with surrounding soft tissue masslike thickening, concerning for metastatic disease. PET CT 10/5/24 reported diffuse activity of the right 10th rib with cortical disruptions and surrounding soft tissue component measuring 3.1 x 1.7cm, compatible with malignant process. No other suspicious FDG avid lesions. He underwent CT guided core biopsy of the right 10th rib on 11/13/24, with pathology interpreted as showing metastatic non-small cell carcinoma, favor adenocarcinoma or primary pulmonary origin; tumor tested PDL1 Low-Positive. Foundation CDx did not show any actionable mutations (RB1 loss, STK11 E293, TP53). He started first line systemic therapy with combination chemotherapy and immunotherapy with carboplatin/pemetrexed and pembrolizumab on 12/19/24. He started SBRT to the 10th rib on 12/23/24 x 5 Fx completed on 1/6/25.  Achieved overall stable disease.  Completed 4 cycles of triplet therapy in Feb 2025. Continued with maintenance pemetrexed and pembrolizumab.  [de-identified] : Right 10th rib, CT-guided core biopsy: metastatic non-small cell carcinoma, favor adenocarcinoma of primary pulmonary origin.  PD-L1 low-positive at 20%. [de-identified] : Patient started first-line systemic therapy with combination chemotherapy and immunotherapy with carboplatin/pemetrexed and pembrolizumab on 12/19/2024. Patient completed SBRT to the right 10th rib in 5 Fx 1/6/25.  Achieved overall stable disease.  Completed 4 cycles of carboplatin, pemetrexed, and pembrolizumab on 2/20/25 and subsequently continued with maintenance pemetrexed and pembrolizumab.  Patient presents today prior to C7 a pemetrexed/pembrolizumab maintenance; his son is present. He reports fatigue along with a dry cough. He reports that he was treated with doxycycline about 2 weeks ago for suspected URI. Pain is improved and he titrated himself off all pain medications.

## 2025-04-25 NOTE — ASSESSMENT
[Medication(s)] : Medication(s) [FreeTextEntry1] : 61M with NSCLC with adenocarcinoma histology with originally a stage IA tumor status post right upper lobectomy in August 2019.  Followed with surveillance.  Developed recurrent/metastatic disease involving the right 10th rib.  Tumor tested PD-L1 low-positive and negative for actionable mutations (RB1 loss, STK11 E293, TP53).  His case was presented at thoracic tumor board 11/27/24, plan for SBRT to rib metastasis for pain control. Systemic therapy recommended. Consideration of chest wall resection with reconstruction depending on clinical course. MRI brain revealed no intracranial metastases. He started first line systemic therapy with combination chemotherapy and immunotherapy with carboplatin, pemetrexed, and pembrolizumab on 12/19/24. He received SBRT to the right 10th rib in 5 Fx 12/23/24 - 1/6/25.  Achieved overall stable disease.   Restaging CT Chest 2/4/25 with stable disease.   Recommend: - Continue fist line systemic therapy with pemetrexed/pembrolizumab maintenance. For C7 today.  - Repeat labs today.  Monitor periodic TFT's while on immunotherapy.  - Continue daily folic acid, dexamethasone in the gurmeet-chemo setting and vitamin B12 administered every 3 cycles while on pemetrexed - Completed SBRT to right 10th rib on 1/6/25 with improvement of pain. Continue follow up with Rad-Onc - Follows with outpatient pain management Dr. Jackson, previously on meloxicam and gabapentin. He has titrated off pain meds - Cough: Recommended OTC allergy medication - Nausea. Use metoclopramide 10mg by mouth Q6hrs as needed for N/V. - Continue management of hypothyroidism by PMD. On levothyroxine.   - Declined psych-onc referral for anxiety exacerbated by cancer diagnosis and treatment.  - Follow-up prior to next cycle or sooner should problems arise - Given that he has a limited disease burden, depending upon his clinical course, could consider treatment for up to a year followed by observation if he is without evidence of disease progression.  Length of treatment TBD depending upon clinical course. -Restaging PET/CT ordered for late May prior to the early June visit with MD.  PET/CT is necessary for extent-of-disease evaluation as he is status post RT to bony areas.  His disease is not completely assessed by CT scan.

## 2025-04-25 NOTE — PHYSICAL EXAM
[Restricted in physically strenuous activity but ambulatory and able to carry out work of a light or sedentary nature] : Status 1- Restricted in physically strenuous activity but ambulatory and able to carry out work of a light or sedentary nature, e.g., light house work, office work [Normal] : affect appropriate [de-identified] : No icterus  [de-identified] : MMM O/P Clear. No mouth sores.  [de-identified] : Supple No LAD  [de-identified] : CTAB [de-identified] : S1 S2 [de-identified] : No edema  [de-identified] : No spine tenderness [de-identified] : Ambulatory without assistive device.  [de-identified] : No rash or skin lesions.

## 2025-05-12 NOTE — PHYSICAL EXAM
[No Respiratory Distress] : no respiratory distress  [No Accessory Muscle Use] : no accessory muscle use [Clear to Auscultation] : lungs were clear to auscultation bilaterally [Regular Rhythm] : with a regular rhythm [No Focal Deficits] : no focal deficits [Normal] : affect was normal and insight and judgment were intact [de-identified] : tachycardic rate

## 2025-05-12 NOTE — HISTORY OF PRESENT ILLNESS
[FreeTextEntry8] : 61M w/ Lung adenocarcinoma on chemo, nonobstructive CAD, ARMANDO, HTN, HLD, hypothyroidism, asthma, anxiety is coming in for new-onset tachycardia.  Went to  for cough 4-6 weeks.   Denies any CP, palpitations, or active SOB. For the past few weeks, some new ABERNATHY.   Not using CPAP for ARMANDO.

## 2025-05-12 NOTE — ASSESSMENT
[FreeTextEntry1] : 61M w/ Lung adenocarcinoma on chemo, nonobstructive CAD, ARMANDO, HTN, HLD, hypothyroidism, asthma, anxiety is coming in for new-onset tachycardia.  #Tachycardia -Discussed case with patient's Cardiologist Dr. Олег Page. Given new-onset sinus tachycardia and patient's cancer history, recommend patient go to ER for further evaluation. Cannot r/o PE. Recommended that patient go via EMS, but patient declines ambulance. Patient verbalized understanding and states he will try to go to [facility].   RTC after hospital visit

## 2025-05-14 NOTE — PHYSICAL EXAM
[Restricted in physically strenuous activity but ambulatory and able to carry out work of a light or sedentary nature] : Status 1- Restricted in physically strenuous activity but ambulatory and able to carry out work of a light or sedentary nature, e.g., light house work, office work [Normal] : affect appropriate [de-identified] : No icterus  [de-identified] : MMM O/P Clear. No mouth sores.  [de-identified] : Supple No LAD  [de-identified] : CTAB [de-identified] : S1 S2 [de-identified] : No edema  [de-identified] : No spine tenderness [de-identified] : Ambulatory without assistive device.  [de-identified] : No rash or skin lesions.

## 2025-05-14 NOTE — HISTORY OF PRESENT ILLNESS
[Disease: _____________________] : Disease: [unfilled] [de-identified] : Mr. Foss is a 61-year-old man, former smoker, who was found with RUL lung nodule on CT lung screening in July 2019. In August 2019 he underwent right upper lobectomy revealing a 1.2cm lung adenocarcinoma, acinar predominant, with negative margins and no lymph node involvement, and a 0.35cm minimally invasive adenocarcinoma in the same lobe. He was monitored with surveillance scans. CT Chest on 9/16/23 revealed a new fracture of the right 10th rib with underlying lucency. CT Chest 9/21/24 showed increase in the right 10th rib sclerosis with surrounding soft tissue masslike thickening, concerning for metastatic disease. PET CT 10/5/24 reported diffuse activity of the right 10th rib with cortical disruptions and surrounding soft tissue component measuring 3.1 x 1.7cm, compatible with malignant process. No other suspicious FDG avid lesions. He underwent CT guided core biopsy of the right 10th rib on 11/13/24, with pathology interpreted as showing metastatic non-small cell carcinoma, favor adenocarcinoma or primary pulmonary origin; tumor tested PDL1 Low-Positive. Foundation CDx did not show any actionable mutations (RB1 loss, STK11 E293, TP53). He started first line systemic therapy with combination chemotherapy and immunotherapy with carboplatin/pemetrexed and pembrolizumab on 12/19/24. He started SBRT to the 10th rib on 12/23/24 x 5 Fx completed on 1/6/25.  Achieved overall stable disease.  Completed 4 cycles of triplet therapy in Feb 2025. Continued with maintenance pemetrexed and pembrolizumab.  [de-identified] : Right 10th rib, CT-guided core biopsy: metastatic non-small cell carcinoma, favor adenocarcinoma of primary pulmonary origin.  PD-L1 low-positive at 20%. [de-identified] : Patient is on first-line systemic therapy for metastatic non-small cell lung adenocarcinoma; completed 4 cycles of carboplatin, pemetrexed, and pembrolizumab on 2/20/25 and continued with maintenance pemetrexed and pembrolizumab with overall stable disease.  Seen prior to C8 pem/pembro today.    Patient completed SBRT to the right 10th rib in 5 Fx 1/6/25.  Achieved overall stable disease.  Completed 4 cycles of carboplatin, pemetrexed, and pembrolizumab on 2/20/25 and subsequently continued with maintenance pemetrexed and pembrolizumab.  Patient presents today prior to C7 a pemetrexed/pembrolizumab maintenance; his son is present. He reports fatigue along with a dry cough. He reports that he was treated with doxycycline about 2 weeks ago for suspected URI. Pain is improved and he titrated himself off all pain medications.

## 2025-05-14 NOTE — ASSESSMENT
[Medication(s)] : Medication(s) [FreeTextEntry1] : 61M with NSCLC with adenocarcinoma histology with originally a stage IA tumor status post right upper lobectomy in August 2019.  Followed with surveillance.  Developed recurrent/metastatic disease involving the right 10th rib.  Tumor tested PD-L1 low-positive and negative for actionable mutations (RB1 loss, STK11 E293, TP53).  His case was presented at thoracic tumor board 11/27/24, plan for SBRT to rib metastasis for pain control. Systemic therapy recommended. Consideration of chest wall resection with reconstruction depending on clinical course. MRI brain revealed no intracranial metastases. He started first line systemic therapy with combination chemotherapy and immunotherapy with carboplatin, pemetrexed, and pembrolizumab on 12/19/24. He received SBRT to the right 10th rib in 5 Fx 12/23/24 - 1/6/25.  Achieved overall stable disease.   Restaging CT Chest 2/4/25 with stable disease.   Recommend: - Continue fist line systemic therapy with pemetrexed/pembrolizumab maintenance. For C8 today.  - Length of treatment TBD depending upon clinical course. Given that he has a limited disease burden, could consider treatment for up to a year followed by observation if he is without evidence of disease progression.   - Repeat labs today.  Monitor periodic TFT's while on immunotherapy.  - Continue daily folic acid, dexamethasone in the gurmeet-chemo setting and vitamin B12 administered every 3 cycles while on pemetrexed - Completed SBRT to right 10th rib on 1/6/25 with improvement of pain. Continue follow up with Rad-Onc - Follows with outpatient pain management Dr. Jackson, previously on meloxicam and gabapentin. He has titrated off pain meds - Cough: Recommended OTC allergy medication - Nausea. Use metoclopramide 10mg by mouth Q6hrs as needed for N/V. - Continue management of hypothyroidism by PMD. On levothyroxine.   - Declined psych-onc referral for anxiety exacerbated by cancer diagnosis and treatment.  - Given that he has a limited disease burden, depending upon his clinical course, could consider treatment for up to a year followed by observation if he is without evidence of disease progression.  Length of treatment TBD depending upon clinical course. -Restaging PET/CT scheduled for 5/28 for review during early June visit with MD.  PET/CT is necessary for extent-of-disease evaluation as he is status post RT to bony areas.  His disease is not completely assessed by CT scan. - Follow up prior to each cycle.  Scheduled through late June.

## 2025-05-29 NOTE — HISTORY OF PRESENT ILLNESS
[TextBox_4] : JULIA is a 61 year male, with history of asthma, BPH, dyspnea on exertion, ARMANDO, lung nodules, adenocarcinoma of right lung, who presents to the office today for a follow up pulmonary evaluation. JULIA reports he was hospitalized 2 weeks ago for a dry cough; he went to urgent care and his heart rate was 139 so he went to [facility]. JULIA notes they took a CT scan/x-ray/abdominal sonogram in the hospital and they could not determine the etiology of his cough. He also states that he is still currently on chemo for his stage IV lung cancer.

## 2025-05-29 NOTE — ASSESSMENT
[FreeTextEntry1] : Dr. Sharma reviewed once again with JULIA his CT scan taken at [facility] in the office today. The study showed to be stable.   Obtained and reviewed pulmonary function test and NiOx results with patient today. PFT was worse in compared to baseline, evident of moderate obstructive airway disease. NiOx was unremarkable.  Dr. Sharma discussed with JULIA the five potential causes of a cough: postnasal drip, GERD, asthma, bronchitis, or bronchiectasis.  JULIA educated on the proper use of Symbicort and was strongly advised to rinse and gargle after taking to prevent oral thrush and/or hoarseness. JULIA acknowledged understanding and competency in the procedure.  Dr. Sharma's recommendation: At this point, JULIA should start taking Symbicort 160-4.5 mcg/act, two puffs twice daily, for active asthma/cough. JULIA should return for pulmonary follow-up in 6 weeks.   Advised Angel to follow-up with Dr. Olivares regarding oral appliance management for history of severe obstructive sleep apnea.

## 2025-05-29 NOTE — PROCEDURE
[FreeTextEntry1] : Pulmonary Function Test performed today, 05/29/2025, in my office:  Spirometry: moderate obstructive airway disease Lung Volume: moderate restrictive defect. Diffusion: severe impairment.  ____________________________________________________________ Exhaled Nitric Oxide             Final  No Documents Attached    	Test  	Result  	Flag	Reference	Goal	Last Verified  	Exhaled Nitric Oxide	16	 	 		REQUIRED  Ordered by: CATALINO LEAL       Collected/Examined: [id number] 08:31AM       Verification Required       Stage: Final       Performed at: In Office       Performed by: CATALINO LEAL       Resulted: [id number] 08:31AM       Last Updated: [id number] 08:31AM

## 2025-05-29 NOTE — SIGNATURES
[TextEntry] : This note was written by Jesús Solitario on 05/29/2025 acting as medical scribe for Dr. Jeannine Sharma. I, Dr. Jeannine Sharma, have read and attest that all the information, medical decision making and discharge instructions within are true and accurate.

## 2025-06-06 NOTE — RESULTS/DATA
[FreeTextEntry1] : - CT Chest 9/21/24: Since September 16, 2023; Interval increase in right 10th rib sclerosis with surrounding soft tissue masslike thickening, concerning for metastatic disease. Post right upper lobectomy with expected stable postsurgical changes.  - PET/CT 10/5/24:   1. Right rib with cortical disruptions and surrounding soft tissue component associated with diffuse increased activity compatible with malignant processes. Tissue confirmation recommended. 2. No FDG avid lung lesions. Stable nonavid left fissural nodule; right lower lobe subpleural nodule not visualized on low-dose CT which may be due to differences in spatial resolution. 3. No other suspicious FDG avid lesions in the remaining field of view.  -MRI Brain 12/5/24:  No evidence of intracranial metastasis.  -CT Chest 2/4/25:  Stable metastatic lesion within the right 10th rib. No new lesion  Images Reviewed/Interpreted:  -CT Chest Angio 5/12/25:  No pulmonary embolus. Unchanged metastatic lesion right 10th rib  -RUQ US 5/13/25:  Normal right upper quadrant abdominal ultrasound.

## 2025-06-06 NOTE — PHYSICAL EXAM
[Restricted in physically strenuous activity but ambulatory and able to carry out work of a light or sedentary nature] : Status 1- Restricted in physically strenuous activity but ambulatory and able to carry out work of a light or sedentary nature, e.g., light house work, office work [Normal] : affect appropriate [de-identified] : No icterus  [de-identified] : MMM O/P Clear. No mouth sores.  [de-identified] : Supple No LAD  [de-identified] : CTAB [de-identified] : S1 S2 [de-identified] : No edema  [de-identified] : No spine tenderness [de-identified] : Ambulatory  [de-identified] : No rash or skin lesions.

## 2025-06-06 NOTE — HISTORY OF PRESENT ILLNESS
[Disease: _____________________] : Disease: [unfilled] [de-identified] : Mr. Foss is a 61-year-old man, former smoker, who was found with RUL lung nodule on CT lung screening in July 2019. In August 2019 he underwent right upper lobectomy revealing a 1.2cm lung adenocarcinoma, acinar predominant, with negative margins and no lymph node involvement, and a 0.35cm minimally invasive adenocarcinoma in the same lobe. He was monitored with surveillance scans. CT Chest on 9/16/23 revealed a new fracture of the right 10th rib with underlying lucency. CT Chest 9/21/24 showed increase in the right 10th rib sclerosis with surrounding soft tissue masslike thickening, concerning for metastatic disease. PET CT 10/5/24 reported diffuse activity of the right 10th rib with cortical disruptions and surrounding soft tissue component measuring 3.1 x 1.7cm, compatible with malignant process. No other suspicious FDG avid lesions. He underwent CT guided core biopsy of the right 10th rib on 11/13/24, with pathology interpreted as showing metastatic non-small cell carcinoma, favor adenocarcinoma or primary pulmonary origin; tumor tested PDL1 Low-Positive. Foundation CDx did not show any actionable mutations (RB1 loss, STK11 E293, TP53). He started first line systemic therapy with combination chemotherapy and immunotherapy with carboplatin/pemetrexed and pembrolizumab on 12/19/24. He started SBRT to the 10th rib on 12/23/24 x 5 Fx completed on 1/6/25.  Achieved overall stable disease.  Completed 4 cycles of triplet therapy in Feb 2025. Continued with maintenance pemetrexed and pembrolizumab.  [de-identified] : Right 10th rib, CT-guided core biopsy: metastatic non-small cell carcinoma, favor adenocarcinoma of primary pulmonary origin.  PD-L1 low-positive at 20%. [de-identified] : Patient started first-line systemic therapy with combination chemotherapy and immunotherapy with carboplatin/pemetrexed and pembrolizumab on 12/19/2024. Patient completed SBRT to the right 10th rib in 5 Fx 1/6/25.  Achieved overall stable disease.  Completed 4 cycles of carboplatin, pemetrexed, and pembrolizumab on 2/20/25 and subsequently continued with maintenance pemetrexed and pembrolizumab.  Patient presents today prior to C8 of pemetrexed/pembrolizumab maintenance. Patient is status post hospitalization 5/12 - 5/14/2025 with tachycardia of unclear etiology; patient was medically managed with subsequent discharge home. He lost a few pounds and is anxious about this. He has ongoing fatigue along with a dry cough. He is following with pulmonary and was recently started on Symbicort. Discussed scheduling cardiology follow-up given his recent hospitalization with tachycardia. Has occasional pain in the rib at the site of the metastasis status post RT. He welcomed a new granddaughter a few days ago.  CT chest angiogram performed in the hospital 5/12/2025 with stable findings; no new findings of concern.

## 2025-06-06 NOTE — HISTORY OF PRESENT ILLNESS
[Disease: _____________________] : Disease: [unfilled] [de-identified] : Mr. Foss is a 61-year-old man, former smoker, who was found with RUL lung nodule on CT lung screening in July 2019. In August 2019 he underwent right upper lobectomy revealing a 1.2cm lung adenocarcinoma, acinar predominant, with negative margins and no lymph node involvement, and a 0.35cm minimally invasive adenocarcinoma in the same lobe. He was monitored with surveillance scans. CT Chest on 9/16/23 revealed a new fracture of the right 10th rib with underlying lucency. CT Chest 9/21/24 showed increase in the right 10th rib sclerosis with surrounding soft tissue masslike thickening, concerning for metastatic disease. PET CT 10/5/24 reported diffuse activity of the right 10th rib with cortical disruptions and surrounding soft tissue component measuring 3.1 x 1.7cm, compatible with malignant process. No other suspicious FDG avid lesions. He underwent CT guided core biopsy of the right 10th rib on 11/13/24, with pathology interpreted as showing metastatic non-small cell carcinoma, favor adenocarcinoma or primary pulmonary origin; tumor tested PDL1 Low-Positive. Foundation CDx did not show any actionable mutations (RB1 loss, STK11 E293, TP53). He started first line systemic therapy with combination chemotherapy and immunotherapy with carboplatin/pemetrexed and pembrolizumab on 12/19/24. He started SBRT to the 10th rib on 12/23/24 x 5 Fx completed on 1/6/25.  Achieved overall stable disease.  Completed 4 cycles of triplet therapy in Feb 2025. Continued with maintenance pemetrexed and pembrolizumab.  [de-identified] : Right 10th rib, CT-guided core biopsy: metastatic non-small cell carcinoma, favor adenocarcinoma of primary pulmonary origin.  PD-L1 low-positive at 20%. [de-identified] : Patient started first-line systemic therapy with combination chemotherapy and immunotherapy with carboplatin/pemetrexed and pembrolizumab on 12/19/2024. Patient completed SBRT to the right 10th rib in 5 Fx 1/6/25.  Achieved overall stable disease.  Completed 4 cycles of carboplatin, pemetrexed, and pembrolizumab on 2/20/25 and subsequently continued with maintenance pemetrexed and pembrolizumab.  Patient presents today prior to C8 of pemetrexed/pembrolizumab maintenance. Patient is status post hospitalization 5/12 - 5/14/2025 with tachycardia of unclear etiology; patient was medically managed with subsequent discharge home. He lost a few pounds and is anxious about this. He has ongoing fatigue along with a dry cough. He is following with pulmonary and was recently started on Symbicort. Discussed scheduling cardiology follow-up given his recent hospitalization with tachycardia. Has occasional pain in the rib at the site of the metastasis status post RT. He welcomed a new granddaughter a few days ago.  CT chest angiogram performed in the hospital 5/12/2025 with stable findings; no new findings of concern.

## 2025-06-06 NOTE — ASSESSMENT
[Medication(s)] : Medication(s) [FreeTextEntry1] : 61M with NSCLC with adenocarcinoma histology with originally a stage IA tumor status post right upper lobectomy in August 2019.  Followed with surveillance.  Developed recurrent/metastatic disease involving the right 10th rib.  Tumor tested PD-L1 low-positive and negative for actionable mutations (RB1 loss, STK11 E293, TP53).  His case was presented at thoracic tumor board 11/27/24, plan for SBRT to rib metastasis for pain control. Systemic therapy recommended. Consideration of chest wall resection with reconstruction depending on clinical course. MRI brain revealed no intracranial metastases. He started first line systemic therapy with combination chemotherapy and immunotherapy with carboplatin, pemetrexed, and pembrolizumab on 12/19/24. He received SBRT to the right 10th rib in 5 Fx 12/23/24 - 1/6/25.  Achieved overall stable disease.   CT Chest 5/12/25 with continued disease stability. Recommend: - Continue fist line systemic therapy with pemetrexed/pembrolizumab maintenance. For C8 today.  - Repeat labs today.  Monitor periodic TFT's while on immunotherapy.  -F/U with Cardiology for tachycardia  - Continue daily folic acid, dexamethasone in the gurmeet-chemo setting and vitamin B12 administered every 3 cycles while on pemetrexed - Completed SBRT to right 10th rib on 1/6/25 with improvement of pain. Continue follow up with Rad-Onc - Follows with outpatient pain management Dr. Jackson, previously on meloxicam and gabapentin. He has titrated off pain meds - Continue Pulm f/u.  Recently started Symbicort  - Nausea. Use metoclopramide 10mg by mouth Q6hrs as needed for N/V. - Continue management of hypothyroidism by PMD. On levothyroxine.   - Declined psych-onc referral for anxiety exacerbated by cancer diagnosis and treatment.  - Follow-up prior to next cycle or sooner should problems arise - Given that he has a limited disease burden, depending upon his clinical course, could consider treatment for up to a year followed by observation if he is without evidence of disease progression.  Length of treatment TBD depending upon clinical course. -Changed the plan for Restaging PET/CT to be done in Early August prior to MD visit with MD, given his recent CT Chest Angio.  PET/CT is necessary for extent-of-disease evaluation as he is status post RT to bony areas.  His disease is not completely assessed by CT scan.

## 2025-06-06 NOTE — PHYSICAL EXAM
[Restricted in physically strenuous activity but ambulatory and able to carry out work of a light or sedentary nature] : Status 1- Restricted in physically strenuous activity but ambulatory and able to carry out work of a light or sedentary nature, e.g., light house work, office work [Normal] : affect appropriate [de-identified] : No icterus  [de-identified] : MMM O/P Clear. No mouth sores.  [de-identified] : Supple No LAD  [de-identified] : CTAB [de-identified] : S1 S2 [de-identified] : No edema  [de-identified] : No spine tenderness [de-identified] : Ambulatory  [de-identified] : No rash or skin lesions.

## 2025-06-18 NOTE — HISTORY OF PRESENT ILLNESS
[FreeTextEntry1] : This is a 60 y/o male w/ PMHx lung CA w/ mets to bone on chemotherapy, HTN, HLD, hypothyroidism, and BPH who presents today for a follow up. Patient is s/p ED visit 5/12/25 for dyspnea on exertion and tachycardia In the ED, he was afebrile, tachycardic up to the 140s, hemodynamically stable, initially saturating well on RA, but then dropped to 88%, so was placed on 2LNC. CBC w/ normocytic anemia of 11.2. CMP w/ transaminitis of , AST/ALT 76/124. CXR with a hazy R basilar opacification that may represent atelectasis vs PNA. CTA chest negative for PE, pneumonia, pleural effusion, and an unchanged metastatic lesion on the R 10th rib. ECG showing sinus tachycardia. Given azithromycin 500mg and IVNS 1L in the ED.  PT w tachycardia of unknown etiology. No sx of infx, CTA Chest neg for PE. TSH wnl. TTE without sig abnormalities. HR improved after IVF Pt w dry cough, intermitter hypoxia unclear cause. O2 sats at rest/ambulation wnl. Seen by pulm, oncology if possible 2/2 chemo iso lung ca w bone mets. Transaminitis improved, RUQ US wnl. Patient reports mild ABERNATHY. He was seen by Dr. Sharma, diagnosed with symbicort, reports improvement in cough.  He reports chest discomfort at site of radiation and site of rib lesion. He denies heart palpitations, dizziness.

## 2025-06-25 NOTE — RESULTS/DATA
[FreeTextEntry1] : - CT Chest 9/21/24: Since September 16, 2023; Interval increase in right 10th rib sclerosis with surrounding soft tissue masslike thickening, concerning for metastatic disease. Post right upper lobectomy with expected stable postsurgical changes.  - PET/CT 10/5/24:   1. Right rib with cortical disruptions and surrounding soft tissue component associated with diffuse increased activity compatible with malignant processes. Tissue confirmation recommended. 2. No FDG avid lung lesions. Stable nonavid left fissural nodule; right lower lobe subpleural nodule not visualized on low-dose CT which may be due to differences in spatial resolution. 3. No other suspicious FDG avid lesions in the remaining field of view.  -MRI Brain 12/5/24:  No evidence of intracranial metastasis.  -CT Chest 2/4/25:  Stable metastatic lesion within the right 10th rib. No new lesion  -CT Chest Angio 5/12/25:  No pulmonary embolus. Unchanged metastatic lesion right 10th rib  -RUQ US 5/13/25:  Normal right upper quadrant abdominal ultrasound.

## 2025-06-25 NOTE — PHYSICAL EXAM
[Restricted in physically strenuous activity but ambulatory and able to carry out work of a light or sedentary nature] : Status 1- Restricted in physically strenuous activity but ambulatory and able to carry out work of a light or sedentary nature, e.g., light house work, office work [Normal] : affect appropriate [de-identified] : No icterus  [de-identified] : MMM O/P Clear. No mouth sores.  [de-identified] : Supple No LAD  [de-identified] : CTAB [de-identified] : S1 S2 [de-identified] : No edema  [de-identified] : Truncal obesity.  [de-identified] : No spine tenderness [de-identified] : Ambulatory  [de-identified] : No rash or skin lesions.

## 2025-06-25 NOTE — HISTORY OF PRESENT ILLNESS
[Disease: _____________________] : Disease: [unfilled] [de-identified] : Mr. Foss is a 62-year-old man, former smoker, who was found with RUL lung nodule on CT lung screening in July 2019. In August 2019 he underwent right upper lobectomy revealing a 1.2cm lung adenocarcinoma, acinar predominant, with negative margins and no lymph node involvement, and a 0.35cm minimally invasive adenocarcinoma in the same lobe. He was monitored with surveillance scans. CT Chest on 9/16/23 revealed a new fracture of the right 10th rib with underlying lucency. CT Chest 9/21/24 showed increase in the right 10th rib sclerosis with surrounding soft tissue masslike thickening, concerning for metastatic disease. PET CT 10/5/24 reported diffuse activity of the right 10th rib with cortical disruptions and surrounding soft tissue component measuring 3.1 x 1.7cm, compatible with malignant process. No other suspicious FDG avid lesions. He underwent CT guided core biopsy of the right 10th rib on 11/13/24, with pathology interpreted as showing metastatic non-small cell carcinoma, favor adenocarcinoma or primary pulmonary origin; tumor tested PDL1 Low-Positive. Foundation CDx did not show any actionable mutations (RB1 loss, STK11 E293, TP53). He started first line systemic therapy with combination chemotherapy and immunotherapy with carboplatin/pemetrexed and pembrolizumab on 12/19/24. He started SBRT to the 10th rib on 12/23/24 in 5Fx completed on 1/6/25.  Achieved overall stable disease.  Completed 4 cycles of triplet therapy in Feb 2025. Continued with maintenance pemetrexed and pembrolizumab.  [de-identified] : Right 10th rib, CT-guided core biopsy: metastatic non-small cell carcinoma, favor adenocarcinoma of primary pulmonary origin.  PD-L1 low-positive at 20%. [de-identified] : Patient completed 4 cycles of first line systemic therapy with carboplatin, pemetrexed, and pembrolizuamb on 2/20/25 and continued on maintenance pemetrexed and pembrolizumab. He completed a course of SBRT to the right 10th rib on 1/6/25.  Seen prior to C9 pemetrexed/pembrolizumab maintenance. Feels mild fatigue, hiccups, nausea not needing antiemetics briefly after treatment.  Feels cough is less since starting inhalers from pulm. Denies dyspnea. No longer using pain meds, states right lower lateral chest discomfort (rib) is mild only when coughing.  Denies F/C/V, hemoptysis, rash, pruritus, diarrhea, or edema. States levothyroxine dose was recently decreased.   CT chest angiogram performed in the hospital 5/12/2025 with stable findings; no new findings of concern.

## 2025-06-25 NOTE — ASSESSMENT
[Medication(s)] : Medication(s) [FreeTextEntry1] : 62M with NSCLC with adenocarcinoma histology with originally a stage IA tumor status post right upper lobectomy in August 2019.  Followed with surveillance.  Developed recurrent/metastatic disease involving the right 10th rib.  Tumor tested PD-L1 low-positive and negative for actionable mutations (RB1 loss, STK11 E293, TP53).  His case was presented at thoracic tumor board 11/27/24, plan for SBRT to rib metastasis for pain control. Systemic therapy recommended. Consideration of chest wall resection with reconstruction depending on clinical course. MRI brain revealed no intracranial metastases. He started first line systemic therapy with combination chemotherapy and immunotherapy with carboplatin, pemetrexed, and pembrolizumab on 12/19/24. He received SBRT to the right 10th rib in 5 Fx 12/23/24 - 1/6/25.  Achieved overall stable disease.   CT Chest 5/12/25 with continued disease stability. Recommend: - Continue fist line systemic therapy with pemetrexed/pembrolizumab maintenance. For C9 today.  - Repeat labs today.  Monitor periodic TFT's while on immunotherapy.  - Continue daily folic acid, dexamethasone in the gurmeet-chemo setting and vitamin B12 administered every 3 cycles while on pemetrexed - Completed SBRT to right 10th rib on 1/6/25 with improvement of pain. Continue follow up with Rad-Onc - Continue follow up with cardiology, with hospitalization in May for tachycardia unknown etiology.  - Follows with outpatient pain management Dr. Jackson, previously on meloxicam and gabapentin. He has titrated off pain meds. - Cough. Reported decreased, has antitussives but has not needed. Continue Pulm follow up.  - Continue Pulm f/u.  Recently started Symbicort  - Nausea. Use metoclopramide 10mg by mouth Q6hrs as needed for N/V. He has not used.  - Continue management of hypothyroidism by PMD. On levothyroxine, recent decrease in dose reported. - Declined psych-onc referral for anxiety exacerbated by cancer diagnosis and treatment.  - Follow-up prior to next cycle or sooner should problems arise - Given that he has a limited disease burden, depending upon his clinical course, could consider treatment for up to a year followed by observation if he is without evidence of disease progression.  Length of treatment TBD depending upon clinical course. - Restaging PET/CT to be done in Early August prior to MD visit with MD, given his recent CT Chest Angio.  PET/CT is necessary for extent-of-disease evaluation as he is status post RT to bony areas.  His disease is not completely assessed by CT scan. Scheduled through mid-Sept.

## 2025-07-16 NOTE — HISTORY OF PRESENT ILLNESS
[Disease: _____________________] : Disease: [unfilled] [de-identified] : Mr. Foss is a 62-year-old man, former smoker, who was found with RUL lung nodule on CT lung screening in July 2019. In August 2019 he underwent right upper lobectomy revealing a 1.2cm lung adenocarcinoma, acinar predominant, with negative margins and no lymph node involvement, and a 0.35cm minimally invasive adenocarcinoma in the same lobe. He was monitored with surveillance scans. CT Chest on 9/16/23 revealed a new fracture of the right 10th rib with underlying lucency. CT Chest 9/21/24 showed increase in the right 10th rib sclerosis with surrounding soft tissue masslike thickening, concerning for metastatic disease. PET CT 10/5/24 reported diffuse activity of the right 10th rib with cortical disruptions and surrounding soft tissue component measuring 3.1 x 1.7cm, compatible with malignant process. No other suspicious FDG avid lesions. He underwent CT guided core biopsy of the right 10th rib on 11/13/24, with pathology interpreted as showing metastatic non-small cell carcinoma, favor adenocarcinoma or primary pulmonary origin; tumor tested PDL1 Low-Positive. Foundation CDx did not show any actionable mutations (RB1 loss, STK11 E293, TP53). He started first line systemic therapy with combination chemotherapy and immunotherapy with carboplatin/pemetrexed and pembrolizumab on 12/19/24. He started SBRT to the 10th rib on 12/23/24 in 5Fx completed on 1/6/25.  Achieved overall stable disease.  Completed 4 cycles of triplet therapy in Feb 2025. Continued with maintenance pemetrexed and pembrolizumab.  [de-identified] : Right 10th rib, CT-guided core biopsy: metastatic non-small cell carcinoma, favor adenocarcinoma of primary pulmonary origin.  PD-L1 low-positive at 20%. [de-identified] : Patient completed 4 cycles of first line systemic therapy with carboplatin, pemetrexed, and pembrolizuamb on 2/20/25 and continued on maintenance pemetrexed and pembrolizumab. He completed a course of SBRT to the right 10th rib on 1/6/25.  Seen prior to C10 pemetrexed/pembrolizumab maintenance. Adherent with folic acid daily and gurmeet-chemo dex. States he had emergency root canal 2 weeks ago; took clindamycin afterwards. Denies any dental issues currently. Reports occasional dry cough which he thinks might be slightly more often than before. Mildly constipated.  Continues working full time in [employer] . Denies dyspnea, F/C, N/V, hemoptysis, rash, pruritus, diarrhea, or pain.   CT chest angiogram performed in the hospital 5/12/2025 with stable findings; no new findings of concern.

## 2025-07-16 NOTE — ASSESSMENT
[Medication(s)] : Medication(s) [FreeTextEntry1] : 62M with NSCLC with adenocarcinoma histology with originally a stage IA tumor status post right upper lobectomy in August 2019.  Followed with surveillance.  Developed recurrent/metastatic disease involving the right 10th rib.  Tumor tested PD-L1 low-positive and negative for actionable mutations (RB1 loss, STK11 E293, TP53).  His case was presented at thoracic tumor board 11/27/24, plan for SBRT to rib metastasis for pain control. Systemic therapy recommended. Consideration of chest wall resection with reconstruction depending on clinical course. MRI brain revealed no intracranial metastases. He started first line systemic therapy with combination chemotherapy and immunotherapy with carboplatin, pemetrexed, and pembrolizumab on 12/19/24. He received SBRT to the right 10th rib in 5 Fx 12/23/24 - 1/6/25.  Achieved overall stable disease.   CT Chest 5/12/25 with continued disease stability. Recommend: - Continue fist line systemic therapy with pemetrexed/pembrolizumab maintenance. For C10 today.  - Repeat labs today.  Periodic TFT check.  - Continue daily folic acid, dexamethasone in the gurmeet-chemo setting and vitamin B12 administered every 3 cycles while on pemetrexed - Leukocytosis. Without fever or s/s infection. Possibly related to gurmeet- chemo dexamethasone? - Discussed to let our office know of any invasive procedures/dental work as he is on chemo.  - Completed SBRT to right 10th rib on 1/6/25 with improvement of pain. Continue follow up with Rad-Onc - Continue follow up with cardiology, with hospitalization in May for tachycardia unknown etiology.  - Follows with outpatient pain management Dr. Jackson, previously on meloxicam and gabapentin. He has titrated off pain meds. - Cough. Continue Pulm f/u.  Recently started Symbicort  - Has nausea meds PRN, has not needed.  - Constipation. Not interested in taking medications for it. Advised can eat prunes, increase fiber/fluid intake.  - Continue management of hypothyroidism by PMD. On levothyroxine 150mcg currently.  - Declined psych-onc referral for anxiety exacerbated by cancer diagnosis and treatment.  - Follow-up prior to next cycle or sooner should problems arise - Given that he has a limited disease burden, depending upon his clinical course, could consider treatment for up to a year followed by observation if he is without evidence of disease progression.  Length of treatment TBD depending upon clinical course. - Restaging PET/CT to be done in Early August prior to MD visit with MD, given his recent CT Chest Angio.  PET/CT is necessary for extent-of-disease evaluation as he is status post RT to bony areas.  His disease is not completely assessed by CT scan. Scheduled for PET CT on 8/1 for review at 8/6 MD follow up visit.  Scheduled through mid-Sept.  Answered patient's multiple questions to his apparent satisfaction.

## 2025-07-16 NOTE — PHYSICAL EXAM
[Restricted in physically strenuous activity but ambulatory and able to carry out work of a light or sedentary nature] : Status 1- Restricted in physically strenuous activity but ambulatory and able to carry out work of a light or sedentary nature, e.g., light house work, office work [Normal] : affect appropriate [de-identified] : No icterus  [de-identified] : MMM O/P Clear. No mouth sores.  [de-identified] : Supple No LAD  [de-identified] : CTAB [de-identified] : S1 S2 [de-identified] : No edema  [de-identified] : Truncal obesity.  [de-identified] : No spine tenderness [de-identified] : Ambulatory  [de-identified] : No rash or skin lesions.  [de-identified] : Talkative and pleasant.

## 2025-07-16 NOTE — HISTORY OF PRESENT ILLNESS
[Disease: _____________________] : Disease: [unfilled] [de-identified] : Mr. Foss is a 62-year-old man, former smoker, who was found with RUL lung nodule on CT lung screening in July 2019. In August 2019 he underwent right upper lobectomy revealing a 1.2cm lung adenocarcinoma, acinar predominant, with negative margins and no lymph node involvement, and a 0.35cm minimally invasive adenocarcinoma in the same lobe. He was monitored with surveillance scans. CT Chest on 9/16/23 revealed a new fracture of the right 10th rib with underlying lucency. CT Chest 9/21/24 showed increase in the right 10th rib sclerosis with surrounding soft tissue masslike thickening, concerning for metastatic disease. PET CT 10/5/24 reported diffuse activity of the right 10th rib with cortical disruptions and surrounding soft tissue component measuring 3.1 x 1.7cm, compatible with malignant process. No other suspicious FDG avid lesions. He underwent CT guided core biopsy of the right 10th rib on 11/13/24, with pathology interpreted as showing metastatic non-small cell carcinoma, favor adenocarcinoma or primary pulmonary origin; tumor tested PDL1 Low-Positive. Foundation CDx did not show any actionable mutations (RB1 loss, STK11 E293, TP53). He started first line systemic therapy with combination chemotherapy and immunotherapy with carboplatin/pemetrexed and pembrolizumab on 12/19/24. He started SBRT to the 10th rib on 12/23/24 in 5Fx completed on 1/6/25.  Achieved overall stable disease.  Completed 4 cycles of triplet therapy in Feb 2025. Continued with maintenance pemetrexed and pembrolizumab.  [de-identified] : Right 10th rib, CT-guided core biopsy: metastatic non-small cell carcinoma, favor adenocarcinoma of primary pulmonary origin.  PD-L1 low-positive at 20%. [de-identified] : Patient completed 4 cycles of first line systemic therapy with carboplatin, pemetrexed, and pembrolizuamb on 2/20/25 and continued on maintenance pemetrexed and pembrolizumab. He completed a course of SBRT to the right 10th rib on 1/6/25.  Seen prior to C10 pemetrexed/pembrolizumab maintenance. Adherent with folic acid daily and gurmeet-chemo dex. States he had emergency root canal 2 weeks ago; took clindamycin afterwards. Denies any dental issues currently. Reports occasional dry cough which he thinks might be slightly more often than before. Mildly constipated.  Continues working full time in [employer] . Denies dyspnea, F/C, N/V, hemoptysis, rash, pruritus, diarrhea, or pain.   CT chest angiogram performed in the hospital 5/12/2025 with stable findings; no new findings of concern.

## 2025-07-16 NOTE — PHYSICAL EXAM
[Restricted in physically strenuous activity but ambulatory and able to carry out work of a light or sedentary nature] : Status 1- Restricted in physically strenuous activity but ambulatory and able to carry out work of a light or sedentary nature, e.g., light house work, office work [Normal] : affect appropriate [de-identified] : No icterus  [de-identified] : MMM O/P Clear. No mouth sores.  [de-identified] : Supple No LAD  [de-identified] : CTAB [de-identified] : S1 S2 [de-identified] : No edema  [de-identified] : Truncal obesity.  [de-identified] : No spine tenderness [de-identified] : Ambulatory  [de-identified] : No rash or skin lesions.  [de-identified] : Talkative and pleasant.

## 2025-07-19 NOTE — ASSESSMENT
[FreeTextEntry1] : Dr. Sharma reviewed once again with JULIA his CT scan taken at [facility] in the office today. The study showed to be stable.   Obtained and reviewed pulmonary function test and NiOx results with patient today. PFT was worse in compared to baseline, evident of moderate obstructive airway disease. NiOx was unremarkable.  Dr. Sharma discussed with JULIA the five potential causes of a cough: postnasal drip, GERD, asthma, bronchitis, or bronchiectasis.  JULIA educated on the proper use of Symbicort and was strongly advised to rinse and gargle after taking to prevent oral thrush and/or hoarseness. JULIA acknowledged understanding and competency in the procedure.  Dr. Sharma's recommendation: At this point, JULIA should start taking Symbicort 160-4.5 mcg/act, two puffs twice daily, for active asthma/cough. JULIA should return for pulmonary follow-up in 6 weeks.   Advised Angel to follow-up with Dr. Olivares regarding oral appliance management for history of severe obstructive sleep apnea.

## 2025-07-19 NOTE — PROCEDURE
[FreeTextEntry1] : Spirometry: Moderate obstructive airway disease. ____________________________________________________________  Exhaled Nitric Oxide             Final  No Documents Attached    	Test  	Result  	Flag	Reference	Goal	Last Verified  	Exhaled Nitric Oxide	14	 	 		REQUIRED  Ordered by: CATALINO LEAL       Collected/Examined: 17-Jul-2025 08:32 am       Verification Required       Stage: Final       Performed at: In Office       Performed by: CATALINO LEAL       Resulted: 17-Jul-2025 08:32 am       Last Updated: 17-Jul-2025 08:32 am

## 2025-07-19 NOTE — HISTORY OF PRESENT ILLNESS
[TextBox_4] : JULIA is a 61 year male, with history of asthma, BPH, dyspnea on exertion, ARMADNO, lung nodules, adenocarcinoma of right lung, who presents to the office today for a follow up pulmonary evaluation. JULIA reports he was hospitalized 2 weeks ago for a dry cough; he went to urgent care and his heart rate was 139 so he went to [facility]. JULIA notes they took a CT scan/x-ray/abdominal sonogram in the hospital and they could not determine the etiology of his cough. He also states that he is still currently on chemo for his stage IV lung cancer.

## 2025-07-19 NOTE — ASSESSMENT
[FreeTextEntry1] : Dr. Sharma reviewed once again with JULIA his CT scan taken at [facility] in the office today. The study showed to be stable.   Obtained and reviewed pulmonary function test and NiOx results with patient today. PFT was worse in compared to baseline, evident of moderate obstructive airway disease. NiOx was unremarkable.  Dr. Sharma discussed with JULIA the five potential causes of a cough: postnasal drip, GERD, asthma, bronchitis, or bronchiectasis.  JULIA educated on the proper use of Symbicort and was strongly advised to rinse and gargle after taking to prevent oral thrush and/or hoarseness. JULIA acknowledged understanding and competency in the procedure.  Dr. Sharma's recommendation: At this point, JULIA should start taking Symbicort 160-4.5 mcg/act, two puffs twice daily, for active asthma/cough. JULIA should return for pulmonary follow-up in 6 weeks.   Advised Angel to follow-up with Dr. Olivares regarding oral appliance management for history of severe obstructive sleep apnea.

## 2025-07-19 NOTE — SIGNATURES
[TextEntry] : This note was written by Jesús Solitario on 05/29/2025 acting as medical scribe for Dr. eJannine Sharma. I, Dr. Jeannine Sharma, have read and attest that all the information, medical decision making and discharge instructions within are true and accurate.

## 2025-07-19 NOTE — HISTORY OF PRESENT ILLNESS
[TextBox_4] : JULIA is a 61 year male, with history of asthma, BPH, dyspnea on exertion, ARMANDO, lung nodules, adenocarcinoma of right lung, who presents to the office today for a follow up pulmonary evaluation. JULIA reports he was hospitalized 2 weeks ago for a dry cough; he went to urgent care and his heart rate was 139 so he went to [facility]. JULIA notes they took a CT scan/x-ray/abdominal sonogram in the hospital and they could not determine the etiology of his cough. He also states that he is still currently on chemo for his stage IV lung cancer.